# Patient Record
Sex: MALE | Race: WHITE | URBAN - METROPOLITAN AREA
[De-identification: names, ages, dates, MRNs, and addresses within clinical notes are randomized per-mention and may not be internally consistent; named-entity substitution may affect disease eponyms.]

---

## 2018-03-07 ENCOUNTER — TRANSFERRED RECORDS (OUTPATIENT)
Dept: HEALTH INFORMATION MANAGEMENT | Facility: CLINIC | Age: 38
End: 2018-03-07

## 2018-03-08 ENCOUNTER — HOSPITAL ENCOUNTER (INPATIENT)
Facility: HOSPITAL | Age: 38
LOS: 5 days | Discharge: HOME | End: 2018-03-13
Attending: PSYCHIATRY & NEUROLOGY | Admitting: PSYCHIATRY & NEUROLOGY
Payer: MEDICAID

## 2018-03-08 DIAGNOSIS — F31.9 BIPOLAR I DISORDER (H): ICD-10-CM

## 2018-03-08 DIAGNOSIS — F17.200 TOBACCO DEPENDENCE SYNDROME: ICD-10-CM

## 2018-03-08 DIAGNOSIS — F41.9 ANXIETY: Primary | ICD-10-CM

## 2018-03-08 PROBLEM — F32.A DEPRESSION WITH SUICIDAL IDEATION: Status: ACTIVE | Noted: 2018-03-08

## 2018-03-08 PROBLEM — R45.851 DEPRESSION WITH SUICIDAL IDEATION: Status: ACTIVE | Noted: 2018-03-08

## 2018-03-08 PROCEDURE — 25000132 ZZH RX MED GY IP 250 OP 250 PS 637: Performed by: NURSE PRACTITIONER

## 2018-03-08 PROCEDURE — 99223 1ST HOSP IP/OBS HIGH 75: CPT | Performed by: NURSE PRACTITIONER

## 2018-03-08 PROCEDURE — 12400000 ZZH R&B MH

## 2018-03-08 PROCEDURE — 12400011

## 2018-03-08 RX ORDER — HYDROXYZINE HYDROCHLORIDE 25 MG/1
25-50 TABLET, FILM COATED ORAL EVERY 4 HOURS PRN
Status: DISCONTINUED | OUTPATIENT
Start: 2018-03-08 | End: 2018-03-13 | Stop reason: HOSPADM

## 2018-03-08 RX ORDER — OLANZAPINE 10 MG/2ML
10 INJECTION, POWDER, FOR SOLUTION INTRAMUSCULAR 3 TIMES DAILY PRN
Status: DISCONTINUED | OUTPATIENT
Start: 2018-03-08 | End: 2018-03-13 | Stop reason: HOSPADM

## 2018-03-08 RX ORDER — DIVALPROEX SODIUM 500 MG/1
500 TABLET, DELAYED RELEASE ORAL DAILY
Status: ON HOLD | COMMUNITY
End: 2018-03-13

## 2018-03-08 RX ORDER — DIVALPROEX SODIUM 500 MG/1
500 TABLET, DELAYED RELEASE ORAL 2 TIMES DAILY
Status: DISCONTINUED | OUTPATIENT
Start: 2018-03-08 | End: 2018-03-10

## 2018-03-08 RX ORDER — BISACODYL 10 MG
10 SUPPOSITORY, RECTAL RECTAL DAILY PRN
Status: DISCONTINUED | OUTPATIENT
Start: 2018-03-08 | End: 2018-03-13 | Stop reason: HOSPADM

## 2018-03-08 RX ORDER — ALUMINA, MAGNESIA, AND SIMETHICONE 2400; 2400; 240 MG/30ML; MG/30ML; MG/30ML
30 SUSPENSION ORAL EVERY 4 HOURS PRN
Status: DISCONTINUED | OUTPATIENT
Start: 2018-03-08 | End: 2018-03-13 | Stop reason: HOSPADM

## 2018-03-08 RX ORDER — OLANZAPINE 10 MG/1
10 TABLET ORAL 3 TIMES DAILY PRN
Status: DISCONTINUED | OUTPATIENT
Start: 2018-03-08 | End: 2018-03-13 | Stop reason: HOSPADM

## 2018-03-08 RX ORDER — TRAZODONE HYDROCHLORIDE 50 MG/1
50 TABLET, FILM COATED ORAL
Status: DISCONTINUED | OUTPATIENT
Start: 2018-03-08 | End: 2018-03-13 | Stop reason: HOSPADM

## 2018-03-08 RX ORDER — TRAZODONE HYDROCHLORIDE 150 MG/1
150 TABLET ORAL AT BEDTIME
Status: DISCONTINUED | OUTPATIENT
Start: 2018-03-08 | End: 2018-03-13 | Stop reason: HOSPADM

## 2018-03-08 RX ORDER — DIVALPROEX SODIUM 500 MG/1
1000 TABLET, DELAYED RELEASE ORAL AT BEDTIME
Status: ON HOLD | COMMUNITY
End: 2018-03-13

## 2018-03-08 RX ORDER — ACETAMINOPHEN 325 MG/1
650 TABLET ORAL EVERY 4 HOURS PRN
Status: DISCONTINUED | OUTPATIENT
Start: 2018-03-08 | End: 2018-03-13 | Stop reason: HOSPADM

## 2018-03-08 RX ORDER — GABAPENTIN 300 MG/1
300 CAPSULE ORAL 3 TIMES DAILY
Status: DISCONTINUED | OUTPATIENT
Start: 2018-03-08 | End: 2018-03-09

## 2018-03-08 RX ADMIN — DIVALPROEX SODIUM 500 MG: 500 TABLET, DELAYED RELEASE ORAL at 20:16

## 2018-03-08 RX ADMIN — NICOTINE POLACRILEX 4 MG: 4 LOZENGE ORAL at 20:16

## 2018-03-08 RX ADMIN — TRAZODONE HYDROCHLORIDE 150 MG: 150 TABLET ORAL at 20:16

## 2018-03-08 RX ADMIN — NICOTINE POLACRILEX 4 MG: 4 LOZENGE ORAL at 23:12

## 2018-03-08 RX ADMIN — GABAPENTIN 300 MG: 300 CAPSULE ORAL at 14:35

## 2018-03-08 RX ADMIN — NICOTINE POLACRILEX 4 MG: 4 LOZENGE ORAL at 17:23

## 2018-03-08 RX ADMIN — GABAPENTIN 300 MG: 300 CAPSULE ORAL at 20:16

## 2018-03-08 RX ADMIN — NICOTINE POLACRILEX 4 MG: 4 LOZENGE ORAL at 18:35

## 2018-03-08 ASSESSMENT — ACTIVITIES OF DAILY LIVING (ADL)
GROOMING: INDEPENDENT
ORAL_HYGIENE: INDEPENDENT
LAUNDRY: UNABLE TO COMPLETE
DRESS: SCRUBS (BEHAVIORAL HEALTH);INDEPENDENT
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
## 2018-03-08 NOTE — IP AVS SNAPSHOT
` `           [facility]: [phone]                                              INTERAGENCY TRANSFER FORM - NURSING   3/8/2018                    Hospital Admission Date: 3/8/2018  TASHA MARKS   : 1980  Sex: Male        Attending Provider: Ruby Boyce NP     Allergies:  Latex    Infection:  None   Service:  Medicaid Com    Ht:  1.829 m (6')   Wt:  109.6 kg (241 lb 9.6 oz)   Admission Wt:  106.4 kg (234 lb 8 oz)    BMI:  32.77 kg/m 2   BSA:  2.36 m 2            Patient PCP Information     Provider PCP Type    Physician No Ref-Primary General      Current Code Status     Date Active Code Status Order ID Comments User Context       3/8/2018  9:27 AM Full Code [id number]  Ruby Boyce NP Inpatient       Code Status History     Date Active Date Inactive Code Status Order ID Comments User Context    This patient has a current code status but no historical code status.      Advance Directives        Scanned docmt in ACP Activity?           No scanned doc        Hospital Problems as of 3/13/2018              Priority Class Noted POA    Depression with suicidal ideation Medium  3/8/2018 Yes      Non-Hospital Problems as of 3/13/2018     None      Immunizations     None         END      ASSESSMENT     Discharge Profile Flowsheet     COMMUNICATION ASSESSMENT     Focused inspection of bony prominences  Nose 18 0854    Patient's communication style  spoken language (English or Bilingual) 18 1129   Procedural focused assessment (identify areas inspected)   Cheek, left;Cheek, right;Ear, left;Ear, right;Nose 18 0854    SKIN     Skin WDL  WDL 18 0854    Inspection of bony prominences  Procedural focused assessment (identify areas inspected) 18 0854                      Assessment WDL (Within Defined Limits) Definitions           Skin WDL     Effective: 09/28/15    Row Information: <b>WDL Definition:</b> Warm; dry; intact; elastic; without discoloration; pressure  "points without redness<br> <font color=\"gray\"><i>Item=AS skin wdl>>List=AS skin wdl>>Version=F14</i></font>      Vitals     Vital Signs Flowsheet     VITAL SIGNS     Patient Currently in Pain  denies 03/13/18 0855    Temp  96  F (35.6  C) 03/13/18 0619   HEIGHT AND WEIGHT      Temp src  Tympanic 03/13/18 0619   Height  1.829 m (6') 03/08/18 1051    Resp  16 03/13/18 0619   Height Method  Stated 03/08/18 1051    Pulse  82 03/13/18 0619   Weight  109.6 kg (241 lb 9.6 oz) 03/11/18 0844    Pulse/Heart Rate Source  Monitor 03/13/18 0619   Weight Method  Standing scale 03/08/18 1051    BP  110/83 03/13/18 0619   BSA (Calculated - sq m)  2.32 03/08/18 1051    OXYGEN THERAPY     BMI (Calculated)  31.87 03/08/18 1051    SpO2  98 % 03/13/18 0619   DAILY CARE      O2 Device  None (Room air) 03/13/18 0619   Activity Assistance Provided  independent 03/13/18 0854    PAIN/COMFORT                   Patient Lines/Drains/Airways Status    Active LINES/DRAINS/AIRWAYS     None            Patient Lines/Drains/Airways Status    Active PICC/CVC     None            Intake/Output Detail Report     None      Case Management/Discharge Planning     Case Management/Discharge Planning Flowsheet     LIVING ENVIRONMENT     ABUSE RISK SCREEN      Lives With  friend(s) (house) 03/08/18 1132   QUESTION TO PATIENT:  Has a member of your family or a partner(now or in the past) intimidated, hurt, manipulated, or controlled you in any way?  no 03/08/18 1414    COPING/STRESS     QUESTION TO PATIENT: Do you feel safe going back to the place where you are living?  yes 03/08/18 1414    Major Change/Loss/Stressor  job change/loss;family problems 03/08/18 1136   OBSERVATION: Is there reason to believe there has been maltreatment of a vulnerable adult (ie. Physical/Sexual/Emotional abuse, self neglect, lack of adequate food, shelter, medical care, or financial exploitation)?  no 03/08/18 1414            "

## 2018-03-08 NOTE — IP AVS SNAPSHOT
` `     [facility]: [phone]            Medication Administration Report for Chandan Ma as of 03/13/18 1133   Legend:    Given Hold Not Given Due Canceled Entry Other Actions    Time Time (Time) Time  Time-Action       Inactive    Active    Linked        Medications 03/07/18 03/08/18 03/09/18 03/10/18 03/11/18 03/12/18 03/13/18    acetaminophen (TYLENOL) tablet 650 mg  Dose: 650 mg  Freq: EVERY 4 HOURS PRN Route: PO  PRN Reason: mild pain  Start: 03/08/18 0927   Admin Instructions: Do not use if the patient has significant liver disease. MAX acetaminophen = 4000 mg/24 hrs.  MAX acetaminophen <3000 mg/24 hrs for patients > or = 65 years old.  Maximum acetaminophen dose from all sources = 75 mg/kg/day not to exceed 4 grams/day.               alum & mag hydroxide-simethicone (MYLANTA ES/MAALOX  ES) suspension 30 mL  Dose: 30 mL  Freq: EVERY 4 HOURS PRN Route: PO  PRN Reason: indigestion  Start: 03/08/18 0927   Admin Instructions: Shake well.               bisacodyl (DULCOLAX) Suppository 10 mg  Dose: 10 mg  Freq: DAILY PRN Route: RE  PRN Reason: constipation  Start: 03/08/18 0927              divalproex sodium delayed-release (DEPAKOTE SPRINKLE) DR capsule 1,000 mg  Dose: 1,000 mg  Freq: AT BEDTIME Route: PO  Start: 03/10/18 2100       2012 (1,000 mg)-Given        2014 (1,000 mg)-Given        2000 (1,000 mg)-Given        [ ] 2100           divalproex sodium delayed-release (DEPAKOTE) DR tablet 500 mg  Dose: 500 mg  Freq: DAILY Route: PO  Start: 03/11/18 0900   Admin Instructions: DO NOT CRUSH.         0844 (500 mg)-Given        0812 (500 mg)-Given        0827 (500 mg)-Given           gabapentin (NEURONTIN) capsule 900 mg  Dose: 900 mg  Freq: 3 TIMES DAILY Route: PO  Start: 03/10/18 2100       2012 (900 mg)-Given        0844 (900 mg)-Given       1333 (900 mg)-Given       2014 (900 mg)-Given        0812 (900 mg)-Given       1317 (900 mg)-Given       2002 (900 mg)-Given        0827 (900 mg)-Given        [ ] 1400       [ ] 2100           hydrOXYzine (ATARAX) tablet 25-50 mg  Dose: 25-50 mg  Freq: EVERY 4 HOURS PRN Route: PO  PRN Reason: anxiety  Start: 03/08/18 0927      1716 (25 mg)-Given       2232 (50 mg)-Given        1144 (50 mg)-Given       1553 (50 mg)-Given       2016 (50 mg)-Given        1218 (50 mg)-Given       1736 (50 mg)-Given       2214 (50 mg)-Given        1620 (50 mg)-Given       2222 (50 mg)-Given        1043 (50 mg)-Given           magnesium hydroxide (MILK OF MAGNESIA) suspension 30 mL  Dose: 30 mL  Freq: AT BEDTIME PRN Route: PO  PRN Reason: constipation  Start: 03/08/18 0927   Admin Instructions: Shake well.               nicotine polacrilex lozenge 4-8 mg  Dose: 4-8 mg  Freq: EVERY 1 HOUR PRN Route: BU  PRN Reason: smoking cessation  Start: 03/08/18 1407   Admin Instructions: Allow lozenge to dissolve completely.  Do Not Bite, Chew, or Swallow.      1723 (4 mg)-Given       1835 (4 mg)-Given       2016 (4 mg)-Given       2312 (4 mg)-Given        1501 (4 mg)-Given       1611 (4 mg)-Given       1716 (4 mg)-Given       1854 (4 mg)-Given       2003 (4 mg)-Given       2137 (4 mg)-Given       2232 (4 mg)-Given        0019 (4 mg)-Given       0615 (4 mg)-Given [C]       0835 (4 mg)-Given       1018 (4 mg)-Given       1221 (4 mg)-Given       1510 (4 mg)-Given       1719 (4 mg)-Given       1828 (4 mg)-Given       2014 (4 mg)-Given       2146 (4 mg)-Given        0030 (4 mg)-Given [C]       0844 (4 mg)-Given       1218 (4 mg)-Given       1545 (4 mg)-Given       1724 (4 mg)-Given       1916 (4 mg)-Given       2102 (4 mg)-Given       2337 (4 mg)-Given        0056 (4 mg)-Given       0421 (4 mg)-Given       0607 (4 mg)-Given       0704 (4 mg)-Given       0830 (4 mg)-Given       1235 (4 mg)-Given       1620 (4 mg)-Given       1827 (8 mg)-Given       2221 (8 mg)-Given       2358 (4 mg)-Given        0602 (4 mg)-Given       0858 (4 mg)-Given           OLANZapine (zyPREXA) tablet 10 mg  Dose: 10 mg  Freq: 3 TIMES  DAILY PRN Route: PO  PRN Reason: agitation  PRN Comment: associated with psychosis or jacob  Start: 03/08/18 0927   Admin Instructions: Consider lower dose if sedation or hypotension.  Combined IM and PO doses may significantly increase the risk of orthostatic hypotension at 30 mg per day or higher.              Or  OLANZapine (zyPREXA) injection 10 mg  Dose: 10 mg  Freq: 3 TIMES DAILY PRN Route: IM  PRN Reason: agitation  PRN Comment: associated with psychosis or jacob  Start: 03/08/18 0927   Admin Instructions: Not to exceed 30 mg in 24 hours.  Consider lower dose if sedation or hypotension.  Dissolve the contents of the 10 mg vial using 2.1 mL of Sterile Water for Injection to provide a solution containing 5 mg/mL of olanzapine. Withdraw the ordered dose from vial. Use immediately (within 1 hour) after reconstitution. Discard any unused portion.               traZODone (DESYREL) tablet 150 mg  Dose: 150 mg  Freq: AT BEDTIME Route: PO  Start: 03/08/18 2100 2016 (150 mg)-Given        2003 (150 mg)-Given        2012 (150 mg)-Given        2015 (150 mg)-Given        2003 (150 mg)-Given        [ ] 2100           traZODone (DESYREL) tablet 50 mg  Dose: 50 mg  Freq: AT BEDTIME PRN Route: PO  PRN Reason: sleep  Start: 03/08/18 0927   Admin Instructions: May repeat x 1        0031 (50 mg)-Given [C]        2330 (50 mg)-Given        2358 (50 mg)-Given           Future Medications  Medications 03/07/18 03/08/18 03/09/18 03/10/18 03/11/18 03/12/18 03/13/18       lurasidone (LATUDA) tablet 120 mg  Dose: 120 mg  Freq: DAILY WITH BREAKFAST Route: PO  Start: 03/14/18 0800             Completed Medications  Medications 03/07/18 03/08/18 03/09/18 03/10/18 03/11/18 03/12/18 03/13/18         Dose: 100 mg  Freq: ONCE Route: PO  Start: 03/13/18 0800   End: 03/13/18 0828          0828 (100 mg)-Given          Discontinued Medications  Medications 03/07/18 03/08/18 03/09/18 03/10/18 03/11/18 03/12/18 03/13/18         Dose: 500  mg  Freq: 2 TIMES DAILY Route: PO  Start: 03/08/18 2100   End: 03/10/18 1420   Admin Instructions: DO NOT CRUSH.      2016 (500 mg)-Given        0833 (500 mg)-Given       2003 (500 mg)-Given        0832 (500 mg)-Given       1420-Med Discontinued            Dose: 600 mg  Freq: 3 TIMES DAILY Route: PO  Start: 03/09/18 2100   End: 03/10/18 1420      2003 (600 mg)-Given        0832 (600 mg)-Given       1326 (600 mg)-Given       1420-Med Discontinued            Dose: 60 mg  Freq: DAILY WITH BREAKFAST Route: PO  Start: 03/09/18 0800   End: 03/10/18 1420      0833 (60 mg)-Given        0832 (60 mg)-Given       1420-Med Discontinued            Dose: 80 mg  Freq: DAILY WITH BREAKFAST Route: PO  Start: 03/11/18 0800   End: 03/12/18 1323        0844 (80 mg)-Given        0812 (80 mg)-Given       1323-Med Discontinued

## 2018-03-08 NOTE — IP AVS SNAPSHOT
[facility]: [phone]                                              INTERAGENCY TRANSFER FORM - LAB / IMAGING / EKG / EMG RESULTS   3/8/2018                    Hospital Admission Date: 3/8/2018  TASHA MARKS   : 1980  Sex: Male        Attending Provider: Ruby Boyce NP     Allergies:  Latex    Infection:  None   Service:  Medicaid Com    Ht:  1.829 m (6')   Wt:  109.6 kg (241 lb 9.6 oz)   Admission Wt:  106.4 kg (234 lb 8 oz)    BMI:  32.77 kg/m 2   BSA:  2.36 m 2            Patient PCP Information     Provider PCP Type    Physician No Ref-Primary General         Lab Results - 3 Days      Valproic acid [[id number]]  Resulted: 18 0630, Result status: Final result    Ordering provider: Bridger Cade NP  18 0001 Resulting lab: [facility]    Specimen Information    Type Source Collected On   Blood  18 0551          Components       Value Reference Range Flag Lab   Valproic Acid Level 92 50 - 100 mg/L  [facility]            Testing Performed By     Lab - Abbreviation Name Director Address Valid Date Range    210 - HI [facility] Unknown [address] [address] 05/08/15 1057 - Present            Unresulted Labs     None      Encounter-Level Documents:     There are no encounter-level documents.      Order-Level Documents:     There are no order-level documents.

## 2018-03-08 NOTE — IP AVS SNAPSHOT
[facility]: [phone]                                              INTERAGENCY TRANSFER FORM - PHYSICIAN ORDERS   3/8/2018                    Hospital Admission Date: 3/8/2018  TASHA MARKS   : 1980  Sex: Male        Attending Provider: Ruby Boyce NP     Allergies:  Latex    Infection:  None   Service:  Medicaid Com    Ht:  1.829 m (6')   Wt:  109.6 kg (241 lb 9.6 oz)   Admission Wt:  106.4 kg (234 lb 8 oz)    BMI:  32.77 kg/m 2   BSA:  2.36 m 2            Patient PCP Information     Provider PCP Type    Physician No Ref-Primary General      ED Clinical Impression     Diagnosis Description Comment Added By Time Added    Anxiety [F41.9] Anxiety [F41.9]  Bridger Cade NP 3/13/2018 10:32 AM    Bipolar I disorder (H) [F31.9] Bipolar I disorder (H) [F31.9]  Bridger Cade NP 3/13/2018 10:35 AM    Tobacco dependence syndrome [F17.200] Tobacco dependence syndrome [F17.200]  Bridger Cade NP 3/13/2018 10:37 AM      Hospital Problems as of 3/13/2018              Priority Class Noted POA    Depression with suicidal ideation Medium  3/8/2018 Yes      Non-Hospital Problems as of 3/13/2018     None      Code Status History     Date Active Date Inactive Code Status Order ID Comments User Context    This patient has a current code status but no historical code status.         Medication Review      START taking        Dose / Directions Comments    hydrOXYzine 25 MG tablet   Commonly known as:  ATARAX   Used for:  Anxiety        Dose:  25-50 mg   Take 1-2 tablets (25-50 mg) by mouth every 4 hours as needed for anxiety   Quantity:  60 tablet   Refills:  0          CONTINUE these medications which may have CHANGED, or have new prescriptions. If we are uncertain of the size of tablets/capsules you have at home, strength may be listed as something that might have changed.        Dose / Directions Comments    gabapentin 300 MG capsule   Commonly known as:  NEURONTIN   This may have changed:     - medication strength  - how much to take   Used for:  Anxiety, Bipolar I disorder (H)        Dose:  900 mg   Take 3 capsules (900 mg) by mouth 3 times daily   Quantity:  270 capsule   Refills:  0        lurasidone 120 MG Tabs tablet   Commonly known as:  LATUDA   This may have changed:    - medication strength  - when to take this  - additional instructions   Used for:  Bipolar I disorder (H)        Dose:  120 mg   Start taking on:  3/14/2018   Take 1 tablet (120 mg) by mouth daily (with breakfast)   Quantity:  30 tablet   Refills:  0        nicotine polacrilex 4 MG lozenge   Commonly known as:  NICORETTE   This may have changed:  how much to take   Used for:  Tobacco dependence syndrome        Dose:  4-8 mg   Place 1-2 lozenges (4-8 mg) inside cheek every hour as needed for smoking cessation   Quantity:  168 tablet   Refills:  0        traZODone 150 MG tablet   Commonly known as:  DESYREL   This may have changed:  medication strength   Used for:  Anxiety        Dose:  150 mg   Take 1 tablet (150 mg) by mouth At Bedtime   Quantity:  30 tablet   Refills:  0          CONTINUE these medications which have NOT CHANGED        Dose / Directions Comments    * divalproex sodium delayed-release 500 MG DR tablet   Commonly known as:  DEPAKOTE   Used for:  Bipolar I disorder (H)        Dose:  500 mg   Take 1 tablet (500 mg) by mouth daily Take daily in the morning   Quantity:  30 tablet   Refills:  0        * divalproex sodium delayed-release 500 MG DR tablet   Commonly known as:  DEPAKOTE   Used for:  Bipolar I disorder (H)        Dose:  1000 mg   Take 2 tablets (1,000 mg) by mouth At Bedtime   Quantity:  60 tablet   Refills:  0        * Notice:  This list has 2 medication(s) that are the same as other medications prescribed for you. Read the directions carefully, and ask your doctor or other care provider to review them with you.              Further instructions from your care team       Behavioral Discharge Planning and  Instructions    Summary: Chandan is a 38 year-old male who was admitted to [address] [place] [address] with suicidal ideation.    Main Diagnosis: Bipolar I Disorder, per history, currently depressed, PTSD, Chronic, Generalized Anxiety Disorder, Antisocial Personality Disorder    Major Treatments, Procedures and Findings: Stabilize with medications, connect with community programs.    Symptoms to Report: feeling more aggressive, increased confusion, losing more sleep, mood getting worse or thoughts of suicide    Lifestyle Adjustment: Take all medications as prescribed, meet with doctor/ medication provider, out patient therapist, , and [facility] worker as scheduled. Abstain from alcohol or any unprescribed drugs.    Psychiatry Follow-up:     [facility]- Medication Management: Mi Sandoval Thursday, May 24th at 12:00 and June 21st @ 2:00 pm  Call 24 hours in advance if canceling.   [address] [place] [address] [address]  [place], [place]  Phone: [phone]  Fax: [phone]    [facility] Human Services:  [address] [place] [address] [address]  [place] [address]  [phone]  Toll Free: [phone]  Fax: [phone]    [facility] Human Services: : Johanna Weir [phone]  [address] [place] [address]  Phone: [phone]    [facility]  PCP-  Alvarado 3/23 @1:10   [address] [place] [address]  Phone: [phone]   Fax: [phone]     Resources:   Crisis Intervention: 305.342.5441 or 253-099-1773 (TTY: 234.657.8878).  Call anytime for help.  National Cooks on Mental Illness (www.mn.liliam.org): 281.646.2345 or 455-778-5062.  Alcoholics Anonymous (www.alcoholics-anonymous.org): Check your phone book for your local chapter.  Suicide Awareness Voices of Education (SAVE) (www.save.org): 185-295-OEND (9935)  National Suicide Prevention Line (www.mentalhealthmn.org): 256-259-RWWM (6785)  Mental Health Consumer/Survivor Network of [place]  (www.[facility].net): 620-283-2560 or 450-863-7345  Mental Health Association of [place] (www.mentalhealth.org): 764.251.2854 or 751-537-9350    General Medication Instructions:   See your medication sheet(s) for instructions.   Take all medicines as directed.  Make no changes unless your doctor suggests them.   Go to all your doctor visits.  Be sure to have all your required lab tests. This way, your medicines can be refilled on time.  Do not use any drugs not prescribed by your doctor.  Avoid alcohol.    [place] Area:  [facility], Crisis stabilization [facility]- [phone]  [facility] Crisis Line: 1-032-468-7542  [facility]ates For Family Peace: 877-1031  Sexual Assault Program [facility]: [phone] or 1-389.189.7030  [facility] Battered Women's Program: 6-705-005-2721 Ext: 279       Calls answered Mon-Fri-8:00 am--4:30 pm    [place]:  [facility] Family Peace: 6-909-684-6942  [facility] first call for help: [phone]  [facility]:  ([phone]      [place] Area:  Warm Line: 1-172.616.7230       Calls answered Tuesday--Saturday 4:00 pm--10:00 pm  [facility] Crisis Line - [phone]  [facility] 845-635-1981    [place] Statewide:  [place] Crisis and Referral Services: 0-312-037-7956  National Suicide Prevention Lifeline: 2-362-070-TALK (7502)   - alm4fqtf- Text  Life  to [address]  First Call for Help: 2-1-1  [facility] Helpline- 8-973-DBZN-HELP        Statement of Approval     Ordered          03/13/18 1039  I have reviewed and agree with all the recommendations and orders detailed in this document.  EFFECTIVE NOW     Approved and electronically signed by:  Bridger Cade NP

## 2018-03-08 NOTE — PROGRESS NOTES
03/08/18 0952   Patient Belongings   Did you bring any home meds/supplements to the hospital?  No   Patient Belongings cell phone/electronics;clothing;keys;wallet   Disposition of Belongings Other (see comment)   Belongings Search Yes   Clothing Search Yes   Second Staff JACQUES   General Info Comment WITH PATIENT: nothing    IN SAFE: 1 gold key, pink ear buds, black cell phone with cracks, blue and grey cellphone case, [place] card, [place] identification card, social security card, [employer] debit card, [place] debit card    IN BELONGINGS ROOM: msc cards, camo wallet, black jacket, black and white teners, 2 grey socks, yellow shirt, blue jeans dark and light pair, grey undies, 6 white socks, white blanket, blue and white belt, grey pants, 1 quarter, 4 pennys, grey undies, orange undies, red jersey, red backpack,  grey shirt, plad shorts, 2 blue longsleeve shirt, 21 nicotine losenges,  redness relief,     List items sent to safe: 1 gold key, pink earbuds, black cellphone with cracks, blue grey cellphone case, [place] card, [place] identification card, social security card, [employer] debit card, [place] debit card  All other belongings put in assigned cubby in belongings room.     I have reviewed my belongings list on admission and verify that it is correct.     Patient signature_______________________________    Second staff witness (if patient unable to sign) ______________________________       I have received all my belongings at discharge.    Patient signature________________________________    [place]   3/8/2018  9:58 AM

## 2018-03-08 NOTE — PLAN OF CARE
Face to face end of shift report received from SEBASTIAN Farris. Rounding completed. Patient observed in bed.     Kriss Ferreira  3/8/2018  3:33 PM

## 2018-03-08 NOTE — IP AVS SNAPSHOT
[facility]    [address] [place] [id number]    Phone:  [phone]    Fax:  [phone]                                       After Visit Summary   3/8/2018    Chandan Ma    MRN: [id number]           After Visit Summary Signature Page     I have received my discharge instructions, and my questions have been answered. I have discussed any challenges I see with this plan with the nurse or doctor.    ..........................................................................................................................................  Patient/Patient Representative Signature      ..........................................................................................................................................  Patient Representative Print Name and Relationship to Patient    ..................................................               ................................................  Date                                            Time    ..........................................................................................................................................  Reviewed by Signature/Title    ...................................................              ..............................................  Date                                                            Time

## 2018-03-08 NOTE — IP AVS SNAPSHOT
Chandan Ma #[id number] (CSN: [id number])  (38 year old M)  (Adm: 18)     [id number]               [facility]: [phone]            Patient Demographics     Patient Name Sex          Age SSN Address Phone    Chandan Ma Male 1980 (38 year old)  [address]  [facility] [id number] [phone] (Home)      Emergency Contact(s)     Name Relation Home Work Mobile    NO PRIMARY CONTACT  [phone]        Admission Information     Attending Provider Admitting Provider Admission Type Admission Date/Time    Ruby Boyce NP Simon, John E, MD Emergency 18  0906    Discharge Date Hospital Service Auth/Cert Status Service Area     LT Medicaid Commitment Incomplete [facility] SERVICES    Unit Room/Bed Admission Status       [facility] [id number] Admission (Confirmed)       Admission     Complaint    mental health, Depression with suicidal ideation      Hospital Account     Name Acct ID Class Status Primary Coverage    Chandan Ma [id number] C Psychiatric Open None            Guarantor Account (for Hospital Account #[id number])     Name Relation to Pt Service Area Active? Acct Type    Chandan Ma Self [facility] Yes Behavioral    Address Phone          [address] [place], [place] [id number] [phone](H)              Coverage Information (for Hospital Account #[id number])     Not on file                                                INTERAGENCY TRANSFER FORM - PHYSICIAN ORDERS   3/8/2018                       [facility]: [phone]            Attending Provider: Ruby Boyce NP     Allergies:  Latex    Infection:  None   Service:  Medicaid Com    Ht:  1.829 m (6')   Wt:  109.6 kg (241 lb 9.6 oz)   Admission Wt:  106.4 kg (234 lb 8 oz)    BMI:  32.77 kg/m 2   BSA:  2.36 m 2            ED Clinical Impression     Diagnosis Description Comment Added By Time Added    Anxiety [F41.9] Anxiety [F41.9]  Bridger Cade,  NP 3/13/2018 10:32 AM    Bipolar I disorder (H) [F31.9] Bipolar I disorder (H) [F31.9]  Bridger Cade NP 3/13/2018 10:35 AM    Tobacco dependence syndrome [F17.200] Tobacco dependence syndrome [F17.200]  Bridger Cade NP 3/13/2018 10:37 AM      Hospital Problems as of 3/13/2018              Priority Class Noted POA    Depression with suicidal ideation Medium  3/8/2018 Yes      Non-Hospital Problems as of 3/13/2018     None      Code Status History     Date Active Date Inactive Code Status Order ID Comments User Context    This patient has a current code status but no historical code status.      Current Code Status     Date Active Code Status Order ID Comments User Context       3/8/2018  9:27 AM Full Code [id number]  Ruby Boyce NP Inpatient          Medication Review      START taking        Dose / Directions Comments    hydrOXYzine 25 MG tablet   Commonly known as:  ATARAX   Used for:  Anxiety        Dose:  25-50 mg   Take 1-2 tablets (25-50 mg) by mouth every 4 hours as needed for anxiety   Quantity:  60 tablet   Refills:  0          CONTINUE these medications which may have CHANGED, or have new prescriptions. If we are uncertain of the size of tablets/capsules you have at home, strength may be listed as something that might have changed.        Dose / Directions Comments    gabapentin 300 MG capsule   Commonly known as:  NEURONTIN   This may have changed:    - medication strength  - how much to take   Used for:  Anxiety, Bipolar I disorder (H)        Dose:  900 mg   Take 3 capsules (900 mg) by mouth 3 times daily   Quantity:  270 capsule   Refills:  0        lurasidone 120 MG Tabs tablet   Commonly known as:  LATUDA   This may have changed:    - medication strength  - when to take this  - additional instructions   Used for:  Bipolar I disorder (H)        Dose:  120 mg   Start taking on:  3/14/2018   Take 1 tablet (120 mg) by mouth daily (with breakfast)   Quantity:  30 tablet   Refills:  0         nicotine polacrilex 4 MG lozenge   Commonly known as:  NICORETTE   This may have changed:  how much to take   Used for:  Tobacco dependence syndrome        Dose:  4-8 mg   Place 1-2 lozenges (4-8 mg) inside cheek every hour as needed for smoking cessation   Quantity:  168 tablet   Refills:  0        traZODone 150 MG tablet   Commonly known as:  DESYREL   This may have changed:  medication strength   Used for:  Anxiety        Dose:  150 mg   Take 1 tablet (150 mg) by mouth At Bedtime   Quantity:  30 tablet   Refills:  0          CONTINUE these medications which have NOT CHANGED        Dose / Directions Comments    * divalproex sodium delayed-release 500 MG DR tablet   Commonly known as:  DEPAKOTE   Used for:  Bipolar I disorder (H)        Dose:  500 mg   Take 1 tablet (500 mg) by mouth daily Take daily in the morning   Quantity:  30 tablet   Refills:  0        * divalproex sodium delayed-release 500 MG DR tablet   Commonly known as:  DEPAKOTE   Used for:  Bipolar I disorder (H)        Dose:  1000 mg   Take 2 tablets (1,000 mg) by mouth At Bedtime   Quantity:  60 tablet   Refills:  0        * Notice:  This list has 2 medication(s) that are the same as other medications prescribed for you. Read the directions carefully, and ask your doctor or other care provider to review them with you.              Further instructions from your care team       Behavioral Discharge Planning and Instructions    Summary: Chandan is a 38 year-old male who was admitted to [address] [place] [address] with suicidal ideation.    Main Diagnosis: Bipolar I Disorder, per history, currently depressed, PTSD, Chronic, Generalized Anxiety Disorder, Antisocial Personality Disorder    Major Treatments, Procedures and Findings: Stabilize with medications, connect with community programs.    Symptoms to Report: feeling more aggressive, increased confusion, losing more sleep, mood getting worse or thoughts of suicide    Lifestyle Adjustment: Take all medications as  prescribed, meet with doctor/ medication provider, out patient therapist, , and [facility] worker as scheduled. Abstain from alcohol or any unprescribed drugs.    Psychiatry Follow-up:     [facility]: Mi Sandoval Thursday, May 24th at 12:00 and June 21st @ 2:00 pm  Call 24 hours in advance if canceling.   [address]  [place], [place]  Phone: [phone]  Fax: [phone]    [facility] Human Services:  [address]  [place] [address]  [phone]  Toll Free: [phone]  Fax: [phone]    [facility] Services: : Johanna Weir [phone]  [address] [place] [address]  Phone: [phone]    [facility]  PCP-  Alvarado 3/23 @1:10   [address] [place] [address]  Phone: [phone]   Fax: [phone]     Resources:   Crisis Intervention: 614.328.9139 or 129-432-2969 (TTY: 865.312.7778).  Call anytime for help.  [place] on Mental Illness (www.mn.liliam.org): 975.526.7225 or 461-860-2763.  Alcoholics Anonymous (www.alcoholics-anonymous.org): Check your phone book for your local chapter.  Suicide Awareness Voices of Education (SAVE) (www.save.org): 808-949-ATNQ (9338)  National Suicide Prevention Line (www.mentalhealthmn.org): 508-815-NKED (1436)  Mental Health Consumer/Survivor Network of [place] (www.mhcsn.net): 303.497.5581 or 661-958-9572  Mental Health Association of [place] (www.mentalhealth.org): 636.966.1650 or 244-191-1277    General Medication Instructions:   See your medication sheet(s) for instructions.   Take all medicines as directed.  Make no changes unless your doctor suggests them.   Go to all your doctor visits.  Be sure to have all your required lab tests. This way, your medicines can be refilled on time.  Do not use any drugs not prescribed by your doctor.  Avoid alcohol.    [place] Area:  [facility], [facility]  stabilization housing- [phone]  [facility] Crisis Line: 9-399-653-3945  Adv[facility]ates For Family Peace: 923-8531  [facility] [place]: 954.291.1485 or 1-496.188.2295  [place] [facility]: 6-791-696-4228 Ext: 279       Calls answered Mon-Fri-8:00 am--4:30 pm    [place]:  [facility] Family Peace: 7-693-100-4481  [facility] first call for help: 4-719-405-1573  [facility]:  (533) 502-4655      [place] Area:  Warm Line: 1-500.552.4776       Calls answered Tuesday--Saturday 4:00 pm--10:00 pm  [facility] Crisis Line - [phone]  [facility] 741-843-4776    [place] Statewide:  [place] Crisis and Referral Services: 0-807-655-2287  National Suicide Prevention Lifeline: 5-388-289-TALK (5060)   - lnj0lcoh- Text  Life  to [address]  First Call for Help: 2-1-1  [facility] Helpline- 5-161-SNHL-HELP        Statement of Approval     Ordered          03/13/18 1039  I have reviewed and agree with all the recommendations and orders detailed in this document.  EFFECTIVE NOW     Approved and electronically signed by:  Bridger Cade NP                                                 INTERAGENCY TRANSFER FORM - NURSING   3/8/2018                       [facility]: [phone]            Attending Provider: Ruby Boyce NP     Allergies:  Latex    Infection:  None   Service:  Medicaid Com    Ht:  1.829 m (6')   Wt:  109.6 kg (241 lb 9.6 oz)   Admission Wt:  106.4 kg (234 lb 8 oz)    BMI:  32.77 kg/m 2   BSA:  2.36 m 2            Advance Directives        Scanned docmt in ACP Activity?           No scanned doc        Immunizations     None      ASSESSMENT     Discharge Profile Flowsheet     COMMUNICATION ASSESSMENT     Focused inspection of bony prominences  Nose 03/13/18 0854    Patient's communication style  spoken language (English or Bilingual) 03/08/18 1129   Procedural focused assessment (identify areas inspected)   Cheek, left;Cheek, right;Ear,  "left;Ear, right;Nose 03/13/18 0854    SKIN     Skin WDL  WDL 03/13/18 0854    Inspection of bony prominences  Procedural focused assessment (identify areas inspected) 03/13/18 0854                      Assessment WDL (Within Defined Limits) Definitions           Skin WDL     Effective: 09/28/15    Row Information: <b>WDL Definition:</b> Warm; dry; intact; elastic; without discoloration; pressure points without redness<br> <font color=\"gray\"><i>Item=AS skin wdl>>List=AS skin wdl>>Version=F14</i></font>      Vitals     Vital Signs Flowsheet     VITAL SIGNS     Patient Currently in Pain  denies 03/13/18 0855    Temp  96  F (35.6  C) 03/13/18 0619   HEIGHT AND WEIGHT      Temp src  Tympanic 03/13/18 0619   Height  1.829 m (6') 03/08/18 1051    Resp  16 03/13/18 0619   Height Method  Stated 03/08/18 1051    Pulse  82 03/13/18 0619   Weight  109.6 kg (241 lb 9.6 oz) 03/11/18 0844    Pulse/Heart Rate Source  Monitor 03/13/18 0619   Weight Method  Standing scale 03/08/18 1051    BP  110/83 03/13/18 0619   BSA (Calculated - sq m)  2.32 03/08/18 1051    OXYGEN THERAPY     BMI (Calculated)  31.87 03/08/18 1051    SpO2  98 % 03/13/18 0619   DAILY CARE      O2 Device  None (Room air) 03/13/18 0619   Activity Assistance Provided  independent 03/13/18 0854    PAIN/COMFORT                   Patient Lines/Drains/Airways Status    Active LINES/DRAINS/AIRWAYS     None            Patient Lines/Drains/Airways Status    Active PICC/CVC     None            Intake/Output Detail Report     None      Case Management/Discharge Planning     Case Management/Discharge Planning Flowsheet     LIVING ENVIRONMENT     ABUSE RISK SCREEN      Lives With  friend(s) (house) 03/08/18 1132   QUESTION TO PATIENT:  Has a member of your family or a partner(now or in the past) intimidated, hurt, manipulated, or controlled you in any way?  no 03/08/18 1414    COPING/STRESS     QUESTION TO PATIENT: Do you feel safe going back to the place where you are living?  " yes 03/08/18 1414    Major Change/Loss/Stressor  job change/loss;family problems 03/08/18 1136   OBSERVATION: Is there reason to believe there has been maltreatment of a vulnerable adult (ie. Physical/Sexual/Emotional abuse, self neglect, lack of adequate food, shelter, medical care, or financial exploitation)?  no 03/08/18 1414                  [facility]: [phone]            Medication Administration Report for Chandan Ma as of 03/13/18 1219   Legend:    Given Hold Not Given Due Canceled Entry Other Actions    Time Time (Time) Time  Time-Action       Inactive    Active    Linked        Medications 03/07/18 03/08/18 03/09/18 03/10/18 03/11/18 03/12/18 03/13/18    acetaminophen (TYLENOL) tablet 650 mg  Dose: 650 mg  Freq: EVERY 4 HOURS PRN Route: PO  PRN Reason: mild pain  Start: 03/08/18 0927   Admin Instructions: Do not use if the patient has significant liver disease. MAX acetaminophen = 4000 mg/24 hrs.  MAX acetaminophen <3000 mg/24 hrs for patients > or = 65 years old.  Maximum acetaminophen dose from all sources = 75 mg/kg/day not to exceed 4 grams/day.               alum & mag hydroxide-simethicone (MYLANTA ES/MAALOX  ES) suspension 30 mL  Dose: 30 mL  Freq: EVERY 4 HOURS PRN Route: PO  PRN Reason: indigestion  Start: 03/08/18 0927   Admin Instructions: Shake well.               bisacodyl (DULCOLAX) Suppository 10 mg  Dose: 10 mg  Freq: DAILY PRN Route: RE  PRN Reason: constipation  Start: 03/08/18 0927              divalproex sodium delayed-release (DEPAKOTE SPRINKLE) DR capsule 1,000 mg  Dose: 1,000 mg  Freq: AT BEDTIME Route: PO  Start: 03/10/18 2100       2012 (1,000 mg)-Given        2014 (1,000 mg)-Given        2000 (1,000 mg)-Given        [ ] 2100           divalproex sodium delayed-release (DEPAKOTE) DR tablet 500 mg  Dose: 500 mg  Freq: DAILY Route: PO  Start: 03/11/18 0900   Admin Instructions: DO NOT CRUSH.         0844 (500 mg)-Given        0812 (500 mg)-Given        0827 (500  mg)-Given           gabapentin (NEURONTIN) capsule 900 mg  Dose: 900 mg  Freq: 3 TIMES DAILY Route: PO  Start: 03/10/18 2100       2012 (900 mg)-Given        0844 (900 mg)-Given       1333 (900 mg)-Given       2014 (900 mg)-Given        0812 (900 mg)-Given       1317 (900 mg)-Given       2002 (900 mg)-Given        0827 (900 mg)-Given       [ ] 1400       [ ] 2100           hydrOXYzine (ATARAX) tablet 25-50 mg  Dose: 25-50 mg  Freq: EVERY 4 HOURS PRN Route: PO  PRN Reason: anxiety  Start: 03/08/18 0927      1716 (25 mg)-Given       2232 (50 mg)-Given        1144 (50 mg)-Given       1553 (50 mg)-Given       2016 (50 mg)-Given        1218 (50 mg)-Given       1736 (50 mg)-Given       2214 (50 mg)-Given        1620 (50 mg)-Given       2222 (50 mg)-Given        1043 (50 mg)-Given           magnesium hydroxide (MILK OF MAGNESIA) suspension 30 mL  Dose: 30 mL  Freq: AT BEDTIME PRN Route: PO  PRN Reason: constipation  Start: 03/08/18 0927   Admin Instructions: Shake well.               nicotine polacrilex lozenge 4-8 mg  Dose: 4-8 mg  Freq: EVERY 1 HOUR PRN Route: BU  PRN Reason: smoking cessation  Start: 03/08/18 1407   Admin Instructions: Allow lozenge to dissolve completely.  Do Not Bite, Chew, or Swallow.      1723 (4 mg)-Given       1835 (4 mg)-Given       2016 (4 mg)-Given       2312 (4 mg)-Given        1501 (4 mg)-Given       1611 (4 mg)-Given       1716 (4 mg)-Given       1854 (4 mg)-Given       2003 (4 mg)-Given       2137 (4 mg)-Given       2232 (4 mg)-Given        0019 (4 mg)-Given       0615 (4 mg)-Given [C]       0835 (4 mg)-Given       1018 (4 mg)-Given       1221 (4 mg)-Given       1510 (4 mg)-Given       1719 (4 mg)-Given       1828 (4 mg)-Given       2014 (4 mg)-Given       2146 (4 mg)-Given        0030 (4 mg)-Given [C]       0844 (4 mg)-Given       1218 (4 mg)-Given       1545 (4 mg)-Given       1724 (4 mg)-Given       1916 (4 mg)-Given       2102 (4 mg)-Given       2337 (4 mg)-Given        0056 (4  mg)-Given       0421 (4 mg)-Given       0607 (4 mg)-Given       0704 (4 mg)-Given       0830 (4 mg)-Given       1235 (4 mg)-Given       1620 (4 mg)-Given       1827 (8 mg)-Given       2221 (8 mg)-Given       2358 (4 mg)-Given        0602 (4 mg)-Given       0858 (4 mg)-Given           OLANZapine (zyPREXA) tablet 10 mg  Dose: 10 mg  Freq: 3 TIMES DAILY PRN Route: PO  PRN Reason: agitation  PRN Comment: associated with psychosis or jacob  Start: 03/08/18 0927   Admin Instructions: Consider lower dose if sedation or hypotension.  Combined IM and PO doses may significantly increase the risk of orthostatic hypotension at 30 mg per day or higher.              Or  OLANZapine (zyPREXA) injection 10 mg  Dose: 10 mg  Freq: 3 TIMES DAILY PRN Route: IM  PRN Reason: agitation  PRN Comment: associated with psychosis or jacob  Start: 03/08/18 0927   Admin Instructions: Not to exceed 30 mg in 24 hours.  Consider lower dose if sedation or hypotension.  Dissolve the contents of the 10 mg vial using 2.1 mL of Sterile Water for Injection to provide a solution containing 5 mg/mL of olanzapine. Withdraw the ordered dose from vial. Use immediately (within 1 hour) after reconstitution. Discard any unused portion.               traZODone (DESYREL) tablet 150 mg  Dose: 150 mg  Freq: AT BEDTIME Route: PO  Start: 03/08/18 2100 2016 (150 mg)-Given        2003 (150 mg)-Given        2012 (150 mg)-Given        2015 (150 mg)-Given        2003 (150 mg)-Given        [ ] 2100           traZODone (DESYREL) tablet 50 mg  Dose: 50 mg  Freq: AT BEDTIME PRN Route: PO  PRN Reason: sleep  Start: 03/08/18 0927   Admin Instructions: May repeat x 1        0031 (50 mg)-Given [C]        2330 (50 mg)-Given        2358 (50 mg)-Given           Future Medications  Medications 03/07/18 03/08/18 03/09/18 03/10/18 03/11/18 03/12/18 03/13/18       lurasidone (LATUDA) tablet 120 mg  Dose: 120 mg  Freq: DAILY WITH BREAKFAST Route: PO  Start: 03/14/18 0800              Completed Medications  Medications 03/07/18 03/08/18 03/09/18 03/10/18 03/11/18 03/12/18 03/13/18         Dose: 100 mg  Freq: ONCE Route: PO  Start: 03/13/18 0800   End: 03/13/18 0828          0828 (100 mg)-Given          Discontinued Medications  Medications 03/07/18 03/08/18 03/09/18 03/10/18 03/11/18 03/12/18 03/13/18         Dose: 500 mg  Freq: 2 TIMES DAILY Route: PO  Start: 03/08/18 2100   End: 03/10/18 1420   Admin Instructions: DO NOT CRUSH.      2016 (500 mg)-Given        0833 (500 mg)-Given       2003 (500 mg)-Given        0832 (500 mg)-Given       1420-Med Discontinued            Dose: 600 mg  Freq: 3 TIMES DAILY Route: PO  Start: 03/09/18 2100   End: 03/10/18 1420      2003 (600 mg)-Given        0832 (600 mg)-Given       1326 (600 mg)-Given       1420-Med Discontinued            Dose: 60 mg  Freq: DAILY WITH BREAKFAST Route: PO  Start: 03/09/18 0800   End: 03/10/18 1420      0833 (60 mg)-Given        0832 (60 mg)-Given       1420-Med Discontinued            Dose: 80 mg  Freq: DAILY WITH BREAKFAST Route: PO  Start: 03/11/18 0800   End: 03/12/18 1323        0844 (80 mg)-Given        0812 (80 mg)-Given       1323-Med Discontinued                 INTERAGENCY TRANSFER FORM - NOTES (H&P, Discharge Summary, Consults, Procedures, Therapies)   3/8/2018                       [facility]: [phone]               History & Physicals      H&P by Bridger Cade NP at 3/8/2018  2:09 PM     Author:  Bridger Cade NP Service:  Psychiatry Author Type:  Nurse Practitioner    Filed:  3/8/2018  2:09 PM Date of Service:  3/8/2018  2:09 PM Creation Time:  3/8/2018  1:39 PM    Status:  Attested :  Bridger Cade NP (Nurse Practitioner)    Cosigner:  Lance Van MD at 3/8/2018  5:30 PM        Attestation signed by Lance Van MD at 3/8/2018  5:30 PM        Physician Attestation   I, Lance Van, have reviewed and discussed with the advanced practice provider their history, physical and plan  "for Chandan Ma. I did not participate in a shared visit by interviewing or examining the patient and this should be billed as an advanced practice provider only visit.    Lance Van  Date of Service (when I saw the patient): I did not personally see this patient today.                               Psychiatric Eval/H&P  Patient Name: Chandan Ma   YOB: 1980  Age: 38 year old  [id number]    Primary Physician: No Ref-Primary, Physician   Completed By: Bridger Cade, VALERIA     CC:  Suicidal thoughts    HPI  Chandan Ma is a 38 year old male who presented via [facility] in [place] with reports of suicidal thoughts and plans to hang himself. Was unable to report trigger but off medications for 2 weeks. Has been self-medicating with marijuana. Previous inpatient stays at [facility], [place], and [place].     Chandan is fairly guarded and become irritable easily. He indicates that he is depressed, thinking of suicide, has deep dark thoughts, is angry with himself and overall feels aggravated. Reports that this has been going on for \"a couple days,\" but it sounds like he has been self-medicating with marijuana for several weeks. Indicates that his marijuana use has increased significantly secondary to symptoms, \"I smoke it all the time, like non-stop.\" Reports that generally he sleeps well when on medications, but struggles some without them. Denies issues with weight, appetite, symptoms of psychosis, and any history of prolonged elevation in mood with disruptive sleep.     He is somewhat defensive about his history and does not understand how discussing any of it is relevant to his current mental health status. He admits to a history of [facility] and skilled nursing time, being on both probation and parole, but refuses to discuss the details. Denies any history of violent behavior or current legal issues. Minimal records available at this time, but there is indicating that previous diagnoses have " "included Episodic Mood disorder, Anxiety Disorder, Polysubstance Dependence, Alcohol dependence, Drug induced mood disorder, Marijuana dependence, BEnzodiazepine Dependence, Impulse control disorder, and Antisocial personality Disorder. There are also previous diagnoses made of Bipolar I Disorder, PTSD, NAVARRO. Previous medications in chart include Prozac, Melatonin, and Geodon.     [facility]     Past Psychiatric History:   Reports previous IP stays for suicidal ideation and previous attempts. Will not discuss attempts. Was seeing a Nurse Practitioner at [facility] for medication management, but was referred to a provider in [place] but apparently missed the appointment. No other outpatient services. Last hospitalization was at [facility] \"last year.\"      Social History:   Residing in an apartment in [place] with a couple roommates, Unemployed with no income. Has GED with a little college. No current legal issues, but history of assisted and [facility] time, probation and parole. Will not discuss details. Records indicate history of theft and credit card fraud. Denies history of violence.      Chemical Use History:   Vague about substance use history, outside of marijuana use. Records indicate that in 2012 he was admitted to a substance abuse unit. He does report previous use of methamphetamine and heroine with sobriety since October 2017.      Family Psychiatric History:   Mother and maternal grandmother are both bipolar. Grandfather committed suicide.         Medical History and ROS  No current outpatient prescriptions on file.     Allergies   Allergen Reactions     Latex      Past Medical History:   Diagnosis Date     Bipolar I disorder, most recent episode depressed (H)     No Comments Provided     Generalized anxiety disorder     No Comments Provided     Ill-defined closed fractures of upper limb     85-86, Right     Methicillin resistant Staphylococcus aureus carrier/suspected carrier     Nov 2012     Posttraumatic " stress disorder     No Comments Provided     Past Surgical History:   Procedure Laterality Date     EXTRACTION(S) DENTAL      No Comments Provided     OTHER SURGICAL HISTORY      [id number],OTHER,inner left groin had an area lanced in 2012         Physical Exam    Constitutional: appears well-developed and well-nourished.   HENT:   Head: Normocephalic and atraumatic.   Right Ear: External ear normal.   Left Ear: External ear normal.   Nose: Nose normal.   Mouth/Throat: External oral area intact. No teeth.   Eyes: Conjunctivae and EOM are normal.   Neck: Normal range of motion.   Cardiovascular: Normal rate.  Pulmonary/Chest: Effort normal. No respiratory distress.    Skin: Dry, intact.    Review of Systems:  Constitution: No weight loss, fever, night sweats  Skin: No rashes, pruritus or open wounds  Neuro: No headaches or seizure activity.  Psych:  See HPI  Eyes: No vision changes.  ENT: No problems chewing or swallowing.   Musculoskeletal: No muscle pain, joint pain or swelling   Respiratory: No cough or dyspnea  Cardiovascular:  No chest pain,  palpitations or fainting  Gastrointestinal:  No abdominal pain, nausea, vomiting or change in bowel habits         MSE/PSYCH  PSYCHIATRIC EXAM  /77  Pulse 69  Temp 97.4  F (36.3  C) (Tympanic)  Resp 16  Ht 1.829 m (6')  Wt 106.4 kg (234 lb 8 oz)  SpO2 97%  BMI 31.8 kg/m2  -Appearance/Behavior: No apparent distress and Disheveled    -Attitude: Guarded, somewhat cooperative  -Motor: normal or unremarkable.  -Gait: Normal.    -Abnormal involuntary movements: None noted.  -Mood: depressed.  -Affect: Appropriate/mood-congruent.  -Speech: Normal volume, rate and content.                 -Thought process/associations: Logical, Linear and Goal directed.  -Thought content: normal .  -Perceptual disturbances: No hallucinations..              -Suicidal/Homicidal Ideation: Passive SI without intent. No HI.   -Judgment: Fair.  -Insight: Adequate.  *Orientation: time, place and  "person.  *Memory: Intact.  *Attention: Good  *Language: fluent, no aphasias, able to repeat phrases and name objects. Vocab intact.  *Fund of information: appropriate for education.  *Cognitive functioning estimate: Average.     Labs:   Preadmission labs available in paper chart. Reviewed with no acute concerns.      Assessment/Impression: This is a 38 year old yo male with a complicated history of mental health, legal, and substance dependence issues. Presents reporting suicidal thoughts and depression over the last couple of days. Has been off of medications for 2 weeks, reporting that he left the bag on the bus and was unable to get them refilled. He would like to get restarted on his medications. Has been self-medicating with excessive use of marijuana. Tells me \"I'm utilizing prevention control - it's better than heroine or meth.\" Will resumed medications as previously prescribed, but dosing will have to be started lower and titrated secondary to the length of time he has been off of them.     Educated regarding medication indications, risks, benefits, side effects, contraindications and possible interactions. Verbally expressed understanding.     DX:  Bipolar I Disorder, per history, currently depressed  PTSD, Chronic  Generalized Anxiety Disorder   Antisocial Personality Disorder    Plan:  Admit to Unit: [address] [place] [address]  Attending: TYLER Erickson CNP  Patient is: Voluntary  Monitor for improvement in mood and response to medications.    Provide a safe environment and therapeutic milieu.   Restart Depakote DR at 500mg twice daily (previously 500 daily and 1000 at bed)  Restart Gabapentin at 300mg three times daily (previously at 600mg three times daily)  Restart Latuda 60mg daily (previously at 120mg daily)  Restart Trazodone 150mg at bed.     Anticipated length of stay: 3-5 days.      TYLER Erickson, CNP[CW1.1]     Revision History        User Key Date/Time User Provider Type Action    > CW1.1 3/8/2018  " "2:09 PM Bridger Cade NP Nurse Practitioner Sign                     Discharge Summaries      Discharge Summaries by Bridger Cade NP at 3/13/2018 10:45 AM     Author:  Bridger Cade NP Service:  Psychiatry Author Type:  Nurse Practitioner    Filed:  3/13/2018 10:45 AM Date of Service:  3/13/2018 10:45 AM Creation Time:  3/13/2018 10:39 AM    Status:  Cosign Needed :  Bridger Cade NP (Nurse Practitioner)    Cosign Required:  Yes             Psychiatric Discharge Summary    Chandan Ma MRN# [id number]   Age: 38 year old YOB: 1980     Date of Admission:  3/8/2018  Date of Discharge:  3/13/2018  Admitting Physician:  Lance Van MD  Discharge Physician:  Bridger Cade NP          Event Leading to Hospitalization and Hospital Stay   HPI  Chandan Ma is a 38 year old male who presented via [facility] in [place] with reports of suicidal thoughts and plans to hang himself. Was unable to report trigger but off medications for 2 weeks. Has been self-medicating with marijuana. Previous inpatient stays at [facility], [place], and [place].      Chandan is fairly guarded and become irritable easily. He indicates that he is depressed, thinking of suicide, has deep dark thoughts, is angry with himself and overall feels aggravated. Reports that this has been going on for \"a couple days,\" but it sounds like he has been self-medicating with marijuana for several weeks. Indicates that his marijuana use has increased significantly secondary to symptoms, \"I smoke it all the time, like non-stop.\" Reports that generally he sleeps well when on medications, but struggles some without them. Denies issues with weight, appetite, symptoms of psychosis, and any history of prolonged elevation in mood with disruptive sleep.      He is somewhat defensive about his history and does not understand how discussing any of it is relevant to his current mental health status. He admits to a history " of [facility] and prison time, being on both probation and parole, but refuses to discuss the details. Denies any history of violent behavior or current legal issues. Minimal records available at this time, but there is indicating that previous diagnoses have included Episodic Mood disorder, Anxiety Disorder, Polysubstance Dependence, Alcohol dependence, Drug induced mood disorder, Marijuana dependence, BEnzodiazepine Dependence, Impulse control disorder, and Antisocial personality Disorder. There are also previous diagnoses made of Bipolar I Disorder, PTSD, NAVARRO. Previous medications in chart include Prozac, Melatonin, and Geodon.      Stay:  Admitted to unit voluntarily. Treated and monitored for improvement in mood. Monitored response to medications. Provided a safe environment and therapeutic milieu. Restarted Depakote DR at 500mg twice daily and titrated back to 500 daily and 1000 at bed. Restarted Gabapentin at 300mg three times daily and titrated back to 600mg three times daily. Was then increased to 900mg three times daily to further target anxiety. Restarted Latuda 60mg daily and titrated back to 120mg daily.  Restarted Trazodone 150mg at bed. Stabilized well. New boarding arranged.     At time of discharge, there is no evidence that patient is in immediate danger of self or others.        DIagnoses:     Bipolar I Disorder, per history, currently depressed  PTSD, Chronic  Generalized Anxiety Disorder   Antisocial Personality Disorder            Labs:[CW1.1]     Results for orders placed or performed during the hospital encounter of 03/08/18   Valproic acid   Result Value Ref Range    Valproic Acid Level 92 50 - 100 mg/L[CW1.2]            Discharge Medications:     Current Discharge Medication List      START taking these medications    Details   hydrOXYzine (ATARAX) 25 MG tablet Take 1-2 tablets (25-50 mg) by mouth every 4 hours as needed for anxiety  Qty: 60 tablet, Refills: 0    Associated Diagnoses: Anxiety          CONTINUE these medications which have CHANGED    Details   !! divalproex sodium delayed-release (DEPAKOTE) 500 MG DR tablet Take 1 tablet (500 mg) by mouth daily Take daily in the morning  Qty: 30 tablet, Refills: 0    Associated Diagnoses: Bipolar I disorder (H)      !! divalproex sodium delayed-release (DEPAKOTE) 500 MG DR tablet Take 2 tablets (1,000 mg) by mouth At Bedtime  Qty: 60 tablet, Refills: 0    Associated Diagnoses: Bipolar I disorder (H)      gabapentin (NEURONTIN) 300 MG capsule Take 3 capsules (900 mg) by mouth 3 times daily  Qty: 270 capsule, Refills: 0    Associated Diagnoses: Anxiety; Bipolar I disorder (H)      traZODone (DESYREL) 150 MG tablet Take 1 tablet (150 mg) by mouth At Bedtime  Qty: 30 tablet, Refills: 0    Associated Diagnoses: Anxiety      lurasidone (LATUDA) 120 MG TABS tablet Take 1 tablet (120 mg) by mouth daily (with breakfast)  Qty: 30 tablet, Refills: 0    Associated Diagnoses: Bipolar I disorder (H)      nicotine polacrilex (NICORETTE) 4 MG lozenge Place 1-2 lozenges (4-8 mg) inside cheek every hour as needed for smoking cessation  Qty: 168 tablet, Refills: 0    Associated Diagnoses: Tobacco dependence syndrome       !! - Potential duplicate medications found. Please discuss with provider.               Psychiatric Examination:   Appearance:  awake, alert and adequately groomed  Attitude:  cooperative  Eye Contact:  good  Mood:  better  Affect:  mood congruent  Speech:  clear, coherent  Psychomotor Behavior:  no evidence of tardive dyskinesia, dystonia, or tics  Thought Process:  logical, linear and goal oriented  Associations:  no loose associations  Thought Content:  no evidence of suicidal ideation or homicidal ideation and no evidence of psychotic thought  Insight:  good  Judgment:  fair  Oriented to:  time, person, and place  Attention Span and Concentration:  intact  Recent and Remote Memory:  intact  Fund of Knowledge: appropriate  Muscle Strength and Tone:  normal  Gait and Station: Normal         Discharge Plan:   Discharge home via taxi. [facility] has a bed for him.     Psychiatry Follow-up:      [facility]- Medication Management: Mi Sandoval Thursday, May 24th at 12:00 and June 21st @ 2:00 pm  Call 24 hours in advance if canceling.   [address]  [place], [place]  Phone: [phone]  Fax: [phone]     [facility] Human Services:  [address]  [place] [address]  [phone]  Toll Free: [phone]  Fax: [phone]     [facility] Services: : Johanna Weir [phone]  [address] [place] [address]  Phone: [phone]      Attestation:  The patient has been seen and evaluated by me,[CW1.1]  Bridger Cade NP[CW1.3]         Discharge Services Provided:    35 minutes spent on discharge services, including:  Final examination of patient.  Review and discussion of Hospital stay.  Instructions for continued outpatient care/goals.  Preparation of discharge records.  Preparation of medications refills and new prescriptions.  Preparation of Applicable referral forms.[CW1.1]      Revision History        User Key Date/Time User Provider Type Action    > CW1.2 3/13/2018 10:45 AM Bridger Cade NP Nurse Practitioner Sign     CW1.3 3/13/2018 10:40 AM Bridger Cade NP Nurse Practitioner      CW1.1 3/13/2018 10:39 AM Bridger Cade NP Nurse Practitioner                   Consult Notes     No notes of this type exist for this encounter.      Progress Notes - Physician (Notes for yesterday and today)     No notes of this type exist for this encounter.      Procedure Notes     No notes of this type exist for this encounter.      Progress Notes - Therapies (Notes from 03/10/18 through 03/13/18)     No notes of this type exist for this encounter.                                          INTERAGENCY TRANSFER FORM - LAB / IMAGING / EKG / EMG  RESULTS   3/8/2018                       [facility]: [phone]            Unresulted Labs     None         Lab Results - 3 Days      Valproic acid [[id number]]  Resulted: 03/13/18 0630, Result status: Final result    Ordering provider: Bridger Cade NP  03/13/18 0001 Resulting lab: [facility]    Specimen Information    Type Source Collected On   Blood  03/13/18 0551          Components       Value Reference Range Flag Lab   Valproic Acid Level 92 50 - 100 mg/L  [facility]            Testing Performed By     Lab - Abbreviation Name Director Address Valid Date Range    210 - HI [facility] Unknown [address] [address] 05/08/15 1057 - Present            Encounter-Level Documents:     There are no encounter-level documents.      Order-Level Documents:     There are no order-level documents.

## 2018-03-08 NOTE — IP AVS SNAPSHOT
MRN:[id number]                      After Visit Summary   3/8/2018    Chandan Ma    MRN: [id number]           Thank you!     Thank you for choosing [facility] for your care. Our goal is always to provide you with excellent care. Hearing back from our patients is one way we can continue to improve our services. Please take a few minutes to complete the written survey that you may receive in the mail after you visit with us. Thank you!        Patient Information     Date Of Birth          1980        Designated Caregiver       Most Recent Value    Caregiver    Will someone help with your care after discharge? no      About your hospital stay     You were admitted on:  March 8, 2018 You last received care in the:  [facility]    You were discharged on:  March 13, 2018       Who to Call     For medical emergencies, please call 911.  For non-urgent questions about your medical care, please call your primary care provider or clinic, None          Attending Provider     Provider Specialty    Lance Van MD Psychiatry    Ruby Boyce NP [facility]       Primary Care Provider Fax #    Physician No Ref-Primary [phone]      Further instructions from your care team       Behavioral Discharge Planning and Instructions    Summary: Chandan is a 38 year-old male who was admitted to [address] [place] [address] with suicidal ideation.    Main Diagnosis: Bipolar I Disorder, per history, currently depressed, PTSD, Chronic, Generalized Anxiety Disorder, Antisocial Personality Disorder    Major Treatments, Procedures and Findings: Stabilize with medications, connect with community programs.    Symptoms to Report: feeling more aggressive, increased confusion, losing more sleep, mood getting worse or thoughts of suicide    Lifestyle Adjustment: Take all medications as prescribed, meet with doctor/ medication provider, out patient therapist, , and [facility] worker as scheduled. Abstain from  alcohol or any unprescribed drugs.    Psychiatry Follow-up:     [facility]: Mi Priestangel Thursday, May 24th at 12:00 and June 21st @ 2:00 pm  Call 24 hours in advance if canceling.   [address]  [place], [place]  Phone: [phone]  Fax: [phone]    [facility] Human Services:  [address]  [place] [address]  [phone]  Toll Free: [phone]  Fax: [phone]    [facility] Services: : Johanna Weir [phone]  [address] [place] [address]  Phone: [phone]    [facility]  PCP-  Alvarado 3/23 @1:10   [address] [place] [address]  Phone: [phone]   Fax: [phone]     Resources:   Crisis Intervention: 635.621.5245 or 098-683-4762 (TTY: 165.642.8147).  Call anytime for help.  National [place] on Mental Illness (www.[place].liliam.org): 503.219.4479 or 107-874-6401.  Alcoholics Anonymous (www.alcoholics-anonymous.org): Check your phone book for your local chapter.  Suicide Awareness Voices of Education (SAVE) (www.save.org): 103-660-YHUW (7705)  National Suicide Prevention Line (www.mentalhealthmn.org): 024-496-BOTU (6487)  Mental Health Consumer/Survivor Network of [place] (www.mhcsn.net): 738.747.1237 or 543-528-3903  Mental Health Association of [place] (www.mentalhealth.org): 715.903.6788 or 077-568-6730    General Medication Instructions:   See your medication sheet(s) for instructions.   Take all medicines as directed.  Make no changes unless your doctor suggests them.   Go to all your doctor visits.  Be sure to have all your required lab tests. This way, your medicines can be refilled on time.  Do not use any drugs not prescribed by your doctor.  Avoid alcohol.    [place] Area:  [facility], Crisis stabilization [facility]- [phone]  [facility] Crisis Line: 1-851.858.3395  [facility]ates [facility] Family [facility]: 368-9716  Sexual Assault Program [facility]  "[facility]: 839.478.2058 or 1-906.862.8842  [place] [facility]: 1-899.207.8995 Ext: 279       Calls answered Mon-Fri-8:00 am--4:30 pm    [place]:  [facility] Family [facility]: 1-107.880.3241  [facility] first call for help: 0-715-169-9462  [facility] Counseling Crisis Center:  ([phone]      [place] Area:  Warm Line: 1-178.647.5316       Calls answered Tuesday--Saturday 4:00 pm--10:00 pm  [facility] Crisis Line - [phone]  [place] [facility] Stabilization 087-130-0663    [place] Statewide:  [place] Crisis and Referral Services: 5-949-694-6497  National Suicide Prevention Lifeline: 4-003-566-TALK (0166)   - msw3qwxn- Text  Life  to [address]  First Call for Help: 2-1-1  [facility] Helpline- 8-532-OZLU-HELP        Pending Results     No orders found from 3/6/2018 to 3/9/2018.            Statement of Approval     Ordered          03/13/18 1039  I have reviewed and agree with all the recommendations and orders detailed in this document.  EFFECTIVE NOW     Approved and electronically signed by:  Bridger Cade NP             Admission Information     Date & Time Provider Department Dept. Phone    3/8/2018 Ruby Boyce NP [facility] [phone]      Your Vitals Were     Blood Pressure Pulse Temperature Respirations Height Weight    110/83 82 96  F (35.6  C) (Tympanic) 16 1.829 m (6') 109.6 kg (241 lb 9.6 oz)    Pulse Oximetry BMI (Body Mass Index)                98% 32.77 kg/m2          [facility] Information     [facility] lets you send messages to your doctor, view your test results, renew your prescriptions, schedule appointments and more. To sign up, go to [email or web address] . Click on \"Log in\" on the left side of the screen, which will take you to the Welcome page. Then click on \"Sign up Now\" on the right side of the page.     You will be asked to enter the access code listed below, as well as some personal information. Please follow the directions to create your username and " password.     Your access code is: [email or web address]  Expires: 2018 11:32 AM     Your access code will  in 90 days. If you need help or a new code, please call your [facility] clinic or [phone].        Care EveryWhere ID     This is your Care EveryWhere ID. This could be used by other organizations to access your [facility] medical records  [id number]        Equal Access to Services     ALBERTO CARD : Hadii jasmin ku hadasho Soomaali, waaxda luqadaha, qaybta kaalmada adeegyada, karri beasleyin haysanfordn laurie anisanathan mcneal . So [facility] [phone].    ATENCIÓN: Si habla destiny, tiene a allen disposición servicios gratuitos de asistencia lingüística. Airamame al [phone].    We comply with applicable federal civil rights laws and [place] laws. We do not discriminate on the basis of race, color, national origin, age, disability, sex, sexual orientation, or gender identity.               Review of your medicines      START taking        Dose / Directions    hydrOXYzine 25 MG tablet   Commonly known as:  ATARAX   Used for:  Anxiety        Dose:  25-50 mg   Take 1-2 tablets (25-50 mg) by mouth every 4 hours as needed for anxiety   Quantity:  60 tablet   Refills:  0         CONTINUE these medicines which may have CHANGED, or have new prescriptions. If we are uncertain of the size of tablets/capsules you have at home, strength may be listed as something that might have changed.        Dose / Directions    gabapentin 300 MG capsule   Commonly known as:  NEURONTIN   This may have changed:    - medication strength  - how much to take   Used for:  Anxiety, Bipolar I disorder (H)        Dose:  900 mg   Take 3 capsules (900 mg) by mouth 3 times daily   Quantity:  270 capsule   Refills:  0       lurasidone 120 MG Tabs tablet   Commonly known as:  LATUDA   This may have changed:    - medication strength  - when to take this  - additional instructions   Used for:  Bipolar I disorder (H)        Dose:  120 mg   Start taking on:   3/14/2018   Take 1 tablet (120 mg) by mouth daily (with breakfast)   Quantity:  30 tablet   Refills:  0       nicotine polacrilex 4 MG lozenge   Commonly known as:  NICORETTE   This may have changed:  how much to take   Used for:  Tobacco dependence syndrome        Dose:  4-8 mg   Place 1-2 lozenges (4-8 mg) inside cheek every hour as needed for smoking cessation   Quantity:  168 tablet   Refills:  0       traZODone 150 MG tablet   Commonly known as:  DESYREL   This may have changed:  medication strength   Used for:  Anxiety        Dose:  150 mg   Take 1 tablet (150 mg) by mouth At Bedtime   Quantity:  30 tablet   Refills:  0         CONTINUE these medicines which have NOT CHANGED        Dose / Directions    * divalproex sodium delayed-release 500 MG DR tablet   Commonly known as:  DEPAKOTE   Used for:  Bipolar I disorder (H)        Dose:  500 mg   Take 1 tablet (500 mg) by mouth daily Take daily in the morning   Quantity:  30 tablet   Refills:  0       * divalproex sodium delayed-release 500 MG DR tablet   Commonly known as:  DEPAKOTE   Used for:  Bipolar I disorder (H)        Dose:  1000 mg   Take 2 tablets (1,000 mg) by mouth At Bedtime   Quantity:  60 tablet   Refills:  0       * Notice:  This list has 2 medication(s) that are the same as other medications prescribed for you. Read the directions carefully, and ask your doctor or other care provider to review them with you.         Where to get your medicines      These medications were sent to [facility] - [facility]UD[facility] DANIELSON - [address]  [address] [address]     Phone:  [phone]     divalproex sodium delayed-release 500 MG DR tablet    divalproex sodium delayed-release 500 MG DR tablet    gabapentin 300 MG capsule    hydrOXYzine 25 MG tablet    lurasidone 120 MG Tabs tablet    nicotine polacrilex 4 MG lozenge    traZODone 150 MG tablet                Protect others around you: Learn how to safely use, store and throw away your  medicines at www.disposemymeds.org.             Medication List: This is a list of all your medications and when to take them. Check marks below indicate your daily home schedule. Keep this list as a reference.      Medications           Morning Afternoon Evening Bedtime As Needed    * divalproex sodium delayed-release 500 MG DR tablet   Commonly known as:  DEPAKOTE   Take 1 tablet (500 mg) by mouth daily Take daily in the morning   Last time this was given:  500 mg on 3/13/2018  8:27 AM                                * divalproex sodium delayed-release 500 MG DR tablet   Commonly known as:  DEPAKOTE   Take 2 tablets (1,000 mg) by mouth At Bedtime   Last time this was given:  500 mg on 3/13/2018  8:27 AM                                gabapentin 300 MG capsule   Commonly known as:  NEURONTIN   Take 3 capsules (900 mg) by mouth 3 times daily   Last time this was given:  900 mg on 3/13/2018  8:27 AM                                hydrOXYzine 25 MG tablet   Commonly known as:  ATARAX   Take 1-2 tablets (25-50 mg) by mouth every 4 hours as needed for anxiety   Last time this was given:  50 mg on 3/13/2018 10:43 AM                                lurasidone 120 MG Tabs tablet   Commonly known as:  LATUDA   Take 1 tablet (120 mg) by mouth daily (with breakfast)   Start taking on:  3/14/2018   Last time this was given:  100 mg on 3/13/2018  8:28 AM                                nicotine polacrilex 4 MG lozenge   Commonly known as:  NICORETTE   Place 1-2 lozenges (4-8 mg) inside cheek every hour as needed for smoking cessation   Last time this was given:  4 mg on 3/13/2018  8:58 AM                                traZODone 150 MG tablet   Commonly known as:  DESYREL   Take 1 tablet (150 mg) by mouth At Bedtime   Last time this was given:  50 mg on 3/12/2018 11:58 PM                                * Notice:  This list has 2 medication(s) that are the same as other medications prescribed for you. Read the directions carefully,  and ask your doctor or other care provider to review them with you.

## 2018-03-08 NOTE — H&P
"Psychiatric Eval/H&P  Patient Name: Chandan Ma   YOB: 1980  Age: 38 year old  [id number]    Primary Physician: No Ref-Primary, Physician   Completed By: Bridger Cade, VALERIA     CC:  Suicidal thoughts    HPI  Chandan Ma is a 38 year old male who presented via [facility] in [place] with reports of suicidal thoughts and plans to hang himself. Was unable to report trigger but off medications for 2 weeks. Has been self-medicating with marijuana. Previous inpatient stays at [facility], [facility], and [facility].     Chandan is fairly guarded and become irritable easily. He indicates that he is depressed, thinking of suicide, has deep dark thoughts, is angry with himself and overall feels aggravated. Reports that this has been going on for \"a couple days,\" but it sounds like he has been self-medicating with marijuana for several weeks. Indicates that his marijuana use has increased significantly secondary to symptoms, \"I smoke it all the time, like non-stop.\" Reports that generally he sleeps well when on medications, but struggles some without them. Denies issues with weight, appetite, symptoms of psychosis, and any history of prolonged elevation in mood with disruptive sleep.     He is somewhat defensive about his history and does not understand how discussing any of it is relevant to his current mental health status. He admits to a history of snf and care home time, being on both probation and parole, but refuses to discuss the details. Denies any history of violent behavior or current legal issues. Minimal records available at this time, but there is indicating that previous diagnoses have included Episodic Mood disorder, Anxiety Disorder, Polysubstance Dependence, Alcohol dependence, Drug induced mood disorder, Marijuana dependence, BEnzodiazepine Dependence, Impulse control disorder, and Antisocial personality Disorder. There are also previous diagnoses made of Bipolar I Disorder, PTSD, NAVARRO. " "Previous medications in chart include Prozac, Melatonin, and Geodon.     [facility] [place]     Past Psychiatric History:   Reports previous IP stays for suicidal ideation and previous attempts. Will not discuss attempts. Was seeing a Nurse Practitioner at [facility] for medication management, but was referred to a provider in [place] but apparently missed the appointment. No other outpatient services. Last hospitalization was at [facility] \"last year.\"      Social History:   Residing in an apartment in [place] with a couple roommates, Unemployed with no income. Has GED with a little college. No current legal issues, but history of [facility] and intermediate time, probation and parole. Will not discuss details. Records indicate history of theft and credit card fraud. Denies history of violence.      Chemical Use History:   Vague about substance use history, outside of marijuana use. Records indicate that in 2012 he was admitted to a substance abuse unit. He does report previous use of methamphetamine and heroine with sobriety since October 2017.      Family Psychiatric History:   Mother and maternal grandmother are both bipolar. Grandfather committed suicide.         Medical History and ROS  No current outpatient prescriptions on file.     Allergies   Allergen Reactions     Latex      Past Medical History:   Diagnosis Date     Bipolar I disorder, most recent episode depressed (H)     No Comments Provided     Generalized anxiety disorder     No Comments Provided     Ill-defined closed fractures of upper limb     85-86, Right     Methicillin resistant Staphylococcus aureus carrier/suspected carrier     Nov 2012     Posttraumatic stress disorder     No Comments Provided     Past Surgical History:   Procedure Laterality Date     EXTRACTION(S) DENTAL      No Comments Provided     OTHER SURGICAL HISTORY      [id number],OTHER,inner left groin had an area lanced in 2012         Physical Exam    Constitutional: appears well-developed and " well-nourished.   HENT:   Head: Normocephalic and atraumatic.   Right Ear: External ear normal.   Left Ear: External ear normal.   Nose: Nose normal.   Mouth/Throat: External oral area intact. No teeth.   Eyes: Conjunctivae and EOM are normal.   Neck: Normal range of motion.   Cardiovascular: Normal rate.  Pulmonary/Chest: Effort normal. No respiratory distress.    Skin: Dry, intact.    Review of Systems:  Constitution: No weight loss, fever, night sweats  Skin: No rashes, pruritus or open wounds  Neuro: No headaches or seizure activity.  Psych:  See HPI  Eyes: No vision changes.  ENT: No problems chewing or swallowing.   Musculoskeletal: No muscle pain, joint pain or swelling   Respiratory: No cough or dyspnea  Cardiovascular:  No chest pain,  palpitations or fainting  Gastrointestinal:  No abdominal pain, nausea, vomiting or change in bowel habits         MSE/PSYCH  PSYCHIATRIC EXAM  /77  Pulse 69  Temp 97.4  F (36.3  C) (Tympanic)  Resp 16  Ht 1.829 m (6')  Wt 106.4 kg (234 lb 8 oz)  SpO2 97%  BMI 31.8 kg/m2  -Appearance/Behavior: No apparent distress and Disheveled    -Attitude: Guarded, somewhat cooperative  -Motor: normal or unremarkable.  -Gait: Normal.    -Abnormal involuntary movements: None noted.  -Mood: depressed.  -Affect: Appropriate/mood-congruent.  -Speech: Normal volume, rate and content.                 -Thought process/associations: Logical, Linear and Goal directed.  -Thought content: normal .  -Perceptual disturbances: No hallucinations..              -Suicidal/Homicidal Ideation: Passive SI without intent. No HI.   -Judgment: Fair.  -Insight: Adequate.  *Orientation: time, place and person.  *Memory: Intact.  *Attention: Good  *Language: fluent, no aphasias, able to repeat phrases and name objects. Vocab intact.  *Fund of information: appropriate for education.  *Cognitive functioning estimate: Average.     Labs:   Preadmission labs available in paper chart. Reviewed with no acute  "concerns.      Assessment/Impression: This is a 38 year old yo male with a complicated history of mental health, legal, and substance dependence issues. Presents reporting suicidal thoughts and depression over the last couple of days. Has been off of medications for 2 weeks, reporting that he left the bag on the bus and was unable to get them refilled. He would like to get restarted on his medications. Has been self-medicating with excessive use of marijuana. Tells me \"I'm utilizing prevention control - it's better than heroine or meth.\" Will resumed medications as previously prescribed, but dosing will have to be started lower and titrated secondary to the length of time he has been off of them.     Educated regarding medication indications, risks, benefits, side effects, contraindications and possible interactions. Verbally expressed understanding.     DX:  Bipolar I Disorder, per history, currently depressed  PTSD, Chronic  Generalized Anxiety Disorder   Antisocial Personality Disorder    Plan:  Admit to Unit: [address] [place] [address]  Attending: TYLER Erickson CNP  Patient is: Voluntary  Monitor for improvement in mood and response to medications.    Provide a safe environment and therapeutic milieu.   Restart Depakote DR at 500mg twice daily (previously 500 daily and 1000 at bed)  Restart Gabapentin at 300mg three times daily (previously at 600mg three times daily)  Restart Latuda 60mg daily (previously at 120mg daily)  Restart Trazodone 150mg at bed.     Anticipated length of stay: 3-5 days.      TYLER Erickson, JOSEPH  "

## 2018-03-08 NOTE — IP AVS SNAPSHOT
` `           [facility]: [phone]                 INTERAGENCY TRANSFER FORM - NOTES (H&P, Discharge Summary, Consults, Procedures, Therapies)   3/8/2018                    Hospital Admission Date: 3/8/2018  CHANDAN MARKS   : 1980  Sex: Male        Patient PCP Information     Provider PCP Type    Physician No Ref-Primary General         History & Physicals      H&P by Bridger Cade NP at 3/8/2018  2:09 PM     Author:  Bridger Cade NP Service:  Psychiatry Author Type:  Nurse Practitioner    Filed:  3/8/2018  2:09 PM Date of Service:  3/8/2018  2:09 PM Creation Time:  3/8/2018  1:39 PM    Status:  Attested :  Bridger Cade NP (Nurse Practitioner)    Cosigner:  Lance Van MD at 3/8/2018  5:30 PM        Attestation signed by Lance Van MD at 3/8/2018  5:30 PM        Physician Attestation   I, Lance Van, have reviewed and discussed with the advanced practice provider their history, physical and plan for Chandan Marks. I did not participate in a shared visit by interviewing or examining the patient and this should be billed as an advanced practice provider only visit.    Lance Vna  Date of Service (when I saw the patient): I did not personally see this patient today.                               Psychiatric Eval/H&P  Patient Name: Chandan Marks   YOB: 1980  Age: 38 year old  [id number]    Primary Physician: No Ref-Primary, Physician   Completed By: Bridger Cade NP     CC:  Suicidal thoughts    HPI  Chandan Marks is a 38 year old male who presented via [facility] in [place] with reports of suicidal thoughts and plans to hang himself. Was unable to report trigger but off medications for 2 weeks. Has been self-medicating with marijuana. Previous inpatient stays at [facility], [place], and [place].     Chandan is fairly guarded and become irritable easily. He indicates that he is depressed, thinking of suicide, has deep dark thoughts,  "is angry with himself and overall feels aggravated. Reports that this has been going on for \"a couple days,\" but it sounds like he has been self-medicating with marijuana for several weeks. Indicates that his marijuana use has increased significantly secondary to symptoms, \"I smoke it all the time, like non-stop.\" Reports that generally he sleeps well when on medications, but struggles some without them. Denies issues with weight, appetite, symptoms of psychosis, and any history of prolonged elevation in mood with disruptive sleep.     He is somewhat defensive about his history and does not understand how discussing any of it is relevant to his current mental health status. He admits to a history of senior care and [facility] time, being on both probation and parole, but refuses to discuss the details. Denies any history of violent behavior or current legal issues. Minimal records available at this time, but there is indicating that previous diagnoses have included Episodic Mood disorder, Anxiety Disorder, Polysubstance Dependence, Alcohol dependence, Drug induced mood disorder, Marijuana dependence, BEnzodiazepine Dependence, Impulse control disorder, and Antisocial personality Disorder. There are also previous diagnoses made of Bipolar I Disorder, PTSD, NAVARRO. Previous medications in chart include Prozac, Melatonin, and Geodon.     [facility]     Past Psychiatric History:   Reports previous IP stays for suicidal ideation and previous attempts. Will not discuss attempts. Was seeing a Nurse Practitioner at [facility] for medication management, but was referred to a provider in [place] but apparently missed the appointment. No other outpatient services. Last hospitalization was at [facility] \"last year.\"      Social History:   Residing in an apartment in [place] with a couple roommates, Unemployed with no income. Has GED with a little college. No current legal issues, but history of senior care and [facility] time, probation and parole. " Will not discuss details. Records indicate history of theft and credit card fraud. Denies history of violence.      Chemical Use History:   Vague about substance use history, outside of marijuana use. Records indicate that in 2012 he was admitted to a substance abuse unit. He does report previous use of methamphetamine and heroine with sobriety since October 2017.      Family Psychiatric History:   Mother and maternal grandmother are both bipolar. Grandfather committed suicide.         Medical History and ROS  No current outpatient prescriptions on file.     Allergies   Allergen Reactions     Latex      Past Medical History:   Diagnosis Date     Bipolar I disorder, most recent episode depressed (H)     No Comments Provided     Generalized anxiety disorder     No Comments Provided     Ill-defined closed fractures of upper limb     85-86, Right     Methicillin resistant Staphylococcus aureus carrier/suspected carrier     Nov 2012     Posttraumatic stress disorder     No Comments Provided     Past Surgical History:   Procedure Laterality Date     EXTRACTION(S) DENTAL      No Comments Provided     OTHER SURGICAL HISTORY      [id number],OTHER,inner left groin had an area lanced in 2012         Physical Exam    Constitutional: appears well-developed and well-nourished.   HENT:   Head: Normocephalic and atraumatic.   Right Ear: External ear normal.   Left Ear: External ear normal.   Nose: Nose normal.   Mouth/Throat: External oral area intact. No teeth.   Eyes: Conjunctivae and EOM are normal.   Neck: Normal range of motion.   Cardiovascular: Normal rate.  Pulmonary/Chest: Effort normal. No respiratory distress.    Skin: Dry, intact.    Review of Systems:  Constitution: No weight loss, fever, night sweats  Skin: No rashes, pruritus or open wounds  Neuro: No headaches or seizure activity.  Psych:  See HPI  Eyes: No vision changes.  ENT: No problems chewing or swallowing.   Musculoskeletal: No muscle pain, joint pain or swelling  "  Respiratory: No cough or dyspnea  Cardiovascular:  No chest pain,  palpitations or fainting  Gastrointestinal:  No abdominal pain, nausea, vomiting or change in bowel habits         MSE/PSYCH  PSYCHIATRIC EXAM  /77  Pulse 69  Temp 97.4  F (36.3  C) (Tympanic)  Resp 16  Ht 1.829 m (6')  Wt 106.4 kg (234 lb 8 oz)  SpO2 97%  BMI 31.8 kg/m2  -Appearance/Behavior: No apparent distress and Disheveled    -Attitude: Guarded, somewhat cooperative  -Motor: normal or unremarkable.  -Gait: Normal.    -Abnormal involuntary movements: None noted.  -Mood: depressed.  -Affect: Appropriate/mood-congruent.  -Speech: Normal volume, rate and content.                 -Thought process/associations: Logical, Linear and Goal directed.  -Thought content: normal .  -Perceptual disturbances: No hallucinations..              -Suicidal/Homicidal Ideation: Passive SI without intent. No HI.   -Judgment: Fair.  -Insight: Adequate.  *Orientation: time, place and person.  *Memory: Intact.  *Attention: Good  *Language: fluent, no aphasias, able to repeat phrases and name objects. Vocab intact.  *Fund of information: appropriate for education.  *Cognitive functioning estimate: Average.     Labs:   Preadmission labs available in paper chart. Reviewed with no acute concerns.      Assessment/Impression: This is a 38 year old yo male with a complicated history of mental health, legal, and substance dependence issues. Presents reporting suicidal thoughts and depression over the last couple of days. Has been off of medications for 2 weeks, reporting that he left the bag on the bus and was unable to get them refilled. He would like to get restarted on his medications. Has been self-medicating with excessive use of marijuana. Tells me \"I'm utilizing prevention control - it's better than heroine or meth.\" Will resumed medications as previously prescribed, but dosing will have to be started lower and titrated secondary to the length of time he " has been off of them.     Educated regarding medication indications, risks, benefits, side effects, contraindications and possible interactions. Verbally expressed understanding.     DX:  Bipolar I Disorder, per history, currently depressed  PTSD, Chronic  Generalized Anxiety Disorder   Antisocial Personality Disorder    Plan:  Admit to Unit: [address] [place] [address]  Attending: TYLER Erickson CNP  Patient is: Voluntary  Monitor for improvement in mood and response to medications.    Provide a safe environment and therapeutic milieu.   Restart Depakote DR at 500mg twice daily (previously 500 daily and 1000 at bed)  Restart Gabapentin at 300mg three times daily (previously at 600mg three times daily)  Restart Latuda 60mg daily (previously at 120mg daily)  Restart Trazodone 150mg at bed.     Anticipated length of stay: 3-5 days.      TYLER Erickson CNP[CW1.1]     Revision History        User Key Date/Time User Provider Type Action    > CW1.1 3/8/2018  2:09 PM Bridger Cade NP Nurse Practitioner Sign                     Discharge Summaries      Discharge Summaries by Bridger Cade NP at 3/13/2018 10:45 AM     Author:  Bridger Cade NP Service:  Psychiatry Author Type:  Nurse Practitioner    Filed:  3/13/2018 10:45 AM Date of Service:  3/13/2018 10:45 AM Creation Time:  3/13/2018 10:39 AM    Status:  Cosign Needed :  Bridger Cade NP (Nurse Practitioner)    Cosign Required:  Yes             Psychiatric Discharge Summary    Chandan Ma MRN# [id number]   Age: 38 year old YOB: 1980     Date of Admission:  3/8/2018  Date of Discharge:  3/13/2018  Admitting Physician:  Lance Van MD  Discharge Physician:  Bridger Cade NP          Event Leading to Hospitalization and Hospital Stay   HPI  Chandan Ma is a 38 year old male who presented via [facility] in [place] with reports of suicidal thoughts and plans to hang himself. Was unable to report trigger but off medications for 2  "weeks. Has been self-medicating with marijuana. Previous inpatient stays at [facility], [place], and [place].      Chandan is fairly guarded and become irritable easily. He indicates that he is depressed, thinking of suicide, has deep dark thoughts, is angry with himself and overall feels aggravated. Reports that this has been going on for \"a couple days,\" but it sounds like he has been self-medicating with marijuana for several weeks. Indicates that his marijuana use has increased significantly secondary to symptoms, \"I smoke it all the time, like non-stop.\" Reports that generally he sleeps well when on medications, but struggles some without them. Denies issues with weight, appetite, symptoms of psychosis, and any history of prolonged elevation in mood with disruptive sleep.      He is somewhat defensive about his history and does not understand how discussing any of it is relevant to his current mental health status. He admits to a history of retirement and intermediate time, being on both probation and parole, but refuses to discuss the details. Denies any history of violent behavior or current legal issues. Minimal records available at this time, but there is indicating that previous diagnoses have included Episodic Mood disorder, Anxiety Disorder, Polysubstance Dependence, Alcohol dependence, Drug induced mood disorder, Marijuana dependence, BEnzodiazepine Dependence, Impulse control disorder, and Antisocial personality Disorder. There are also previous diagnoses made of Bipolar I Disorder, PTSD, NAVARRO. Previous medications in chart include Prozac, Melatonin, and Geodon.      Stay:  Admitted to unit voluntarily. Treated and monitored for improvement in mood. Monitored response to medications. Provided a safe environment and therapeutic milieu. Restarted Depakote DR at 500mg twice daily and titrated back to 500 daily and 1000 at bed. Restarted Gabapentin at 300mg three times daily and titrated back to 600mg three times " daily. Was then increased to 900mg three times daily to further target anxiety. Restarted Latuda 60mg daily and titrated back to 120mg daily.  Restarted Trazodone 150mg at bed. Stabilized well. New boarding arranged.     At time of discharge, there is no evidence that patient is in immediate danger of self or others.        DIagnoses:     Bipolar I Disorder, per history, currently depressed  PTSD, Chronic  Generalized Anxiety Disorder   Antisocial Personality Disorder            Labs:[CW1.1]     Results for orders placed or performed during the hospital encounter of 03/08/18   Valproic acid   Result Value Ref Range    Valproic Acid Level 92 50 - 100 mg/L[CW1.2]            Discharge Medications:     Current Discharge Medication List      START taking these medications    Details   hydrOXYzine (ATARAX) 25 MG tablet Take 1-2 tablets (25-50 mg) by mouth every 4 hours as needed for anxiety  Qty: 60 tablet, Refills: 0    Associated Diagnoses: Anxiety         CONTINUE these medications which have CHANGED    Details   !! divalproex sodium delayed-release (DEPAKOTE) 500 MG DR tablet Take 1 tablet (500 mg) by mouth daily Take daily in the morning  Qty: 30 tablet, Refills: 0    Associated Diagnoses: Bipolar I disorder (H)      !! divalproex sodium delayed-release (DEPAKOTE) 500 MG DR tablet Take 2 tablets (1,000 mg) by mouth At Bedtime  Qty: 60 tablet, Refills: 0    Associated Diagnoses: Bipolar I disorder (H)      gabapentin (NEURONTIN) 300 MG capsule Take 3 capsules (900 mg) by mouth 3 times daily  Qty: 270 capsule, Refills: 0    Associated Diagnoses: Anxiety; Bipolar I disorder (H)      traZODone (DESYREL) 150 MG tablet Take 1 tablet (150 mg) by mouth At Bedtime  Qty: 30 tablet, Refills: 0    Associated Diagnoses: Anxiety      lurasidone (LATUDA) 120 MG TABS tablet Take 1 tablet (120 mg) by mouth daily (with breakfast)  Qty: 30 tablet, Refills: 0    Associated Diagnoses: Bipolar I disorder (H)      nicotine polacrilex  (NICORETTE) 4 MG lozenge Place 1-2 lozenges (4-8 mg) inside cheek every hour as needed for smoking cessation  Qty: 168 tablet, Refills: 0    Associated Diagnoses: Tobacco dependence syndrome       !! - Potential duplicate medications found. Please discuss with provider.               Psychiatric Examination:   Appearance:  awake, alert and adequately groomed  Attitude:  cooperative  Eye Contact:  good  Mood:  better  Affect:  mood congruent  Speech:  clear, coherent  Psychomotor Behavior:  no evidence of tardive dyskinesia, dystonia, or tics  Thought Process:  logical, linear and goal oriented  Associations:  no loose associations  Thought Content:  no evidence of suicidal ideation or homicidal ideation and no evidence of psychotic thought  Insight:  good  Judgment:  fair  Oriented to:  time, person, and place  Attention Span and Concentration:  intact  Recent and Remote Memory:  intact  Fund of Knowledge: appropriate  Muscle Strength and Tone: normal  Gait and Station: Normal         Discharge Plan:   Discharge home via taxi. [facility] has a [facility] for him.     Psychiatry Follow-up:      [facility]- Medication Management: Mi Sandoval Thursday, May 24th at 12:00 and June 21st @ 2:00 pm  Call 24 hours in advance if canceling.   [address] [place] [address] [address]  [place], [place]  Phone: [phone]  Fax: [phone]     [facility]:  [address] [place] [address] [address]  [place] [address]  [phone]  Toll Free: [phone]  Fax: [phone]     [facility] and [facility]: : Johanna Weir [phone]  [address] [place] [address]  Phone: [phone]      Attestation:  The patient has been seen and evaluated by me,[CW1.1]  Bridger Cade NP[CW1.3]         Discharge Services Provided:    35 minutes spent on discharge services, including:  Final examination of patient.  Review and discussion of Hospital stay.  Instructions for  "continued outpatient care/goals.  Preparation of discharge records.  Preparation of medications refills and new prescriptions.  Preparation of Applicable referral forms.[CW1.1]      Revision History        User Key Date/Time User Provider Type Action    > CW1.2 3/13/2018 10:45 AM Bridger Cade NP Nurse Practitioner Sign     CW1.3 3/13/2018 10:40 AM Bridger Cade NP Nurse Practitioner      CW1.1 3/13/2018 10:39 AM Bridger Cade NP Nurse Practitioner                   Consult Notes     No notes of this type exist for this encounter.         Progress Notes - Physician (Notes from 03/10/18 through 03/13/18)      Progress Notes by Bridger Cade NP at 3/12/2018  1:23 PM     Author:  Bridger Cade NP Service:  Psychiatry Author Type:  Nurse Practitioner    Filed:  3/12/2018  1:34 PM Date of Service:  3/12/2018  1:23 PM Creation Time:  3/12/2018  1:23 PM    Status:  Signed :  Bridger Cade NP (Nurse Practitioner)         [facility]  Psychiatric Progress Note      Impression:     Chandan Ma is a 38 year old male who presented via [facility] in [place] with reports of suicidal thoughts and plans to hang himself. Was unable to report trigger but off medications for 2 weeks. Has been self-medicating with marijuana. Previous inpatient stays at [facility], [place], and [place].     Tired today. Reports he slept poorly last night. Indicates, \"that's my life. Either I sleep a lot or my sleep sucks. I'm always tired since I was a kid.\" He was provided Trazodone last night sometime after midnight. Did not appear to help. Continues to report passive SI. Denies intent. Reports mood as \"Eh. Not great.\" Denies psychosis.     Is wanting to go to a [facility] as he can no longer afford rent. His CM is okay with this. He is under commitment through [facility]. His medical insurance has lapsed and financial did come up to help him get set up with MA.            DIagnoses: "     Bipolar I Disorder, per history, currently depressed  PTSD, Chronic  Generalized Anxiety Disorder   Antisocial Personality Disorder    Attestation:  Patient has been seen and evaluated by me,  Bridger Cade NP          Interim History:   The patient's care was discussed with the treatment team and chart notes were reviewed.          Medications:   I have reviewed this patient's current medications  Prescription Medications as of 3/12/2018             Lurasidone HCl (LATUDA PO) Take 120 mg by mouth daily Take daily in the morning with breakfast    divalproex sodium delayed-release (DEPAKOTE) 500 MG DR tablet Take 500 mg by mouth daily Take daily in the morning    divalproex sodium delayed-release (DEPAKOTE) 500 MG DR tablet Take 1,000 mg by mouth At Bedtime    TRAZODONE HCL PO Take 150 mg by mouth At Bedtime    GABAPENTIN PO Take 600 mg by mouth 3 times daily    nicotine polacrilex (NICORETTE) 4 MG lozenge Place 8 mg inside cheek every hour as needed for smoking cessation      Facility Administered Medications as of 3/12/2018             lurasidone (LATUDA) tablet 100 mg Starting on 3/13/2018. Take 100 mg by mouth once    lurasidone (LATUDA) tablet 120 mg Starting on 3/13/2018. Take 3 tablets (120 mg) by mouth daily (with breakfast)    gabapentin (NEURONTIN) capsule 900 mg Take 3 capsules (900 mg) by mouth 3 times daily    divalproex sodium delayed-release (DEPAKOTE) DR tablet 500 mg Take 1 tablet (500 mg) by mouth daily    divalproex sodium delayed-release (DEPAKOTE SPRINKLE) DR capsule 1,000 mg Take 8 capsules (1,000 mg) by mouth At Bedtime    lurasidone (LATUDA) tablet 80 mg (Discontinued) Take 2 tablets (80 mg) by mouth daily (with breakfast)    acetaminophen (TYLENOL) tablet 650 mg Take 2 tablets (650 mg) by mouth every 4 hours as needed for mild pain    alum & mag hydroxide-simethicone (MYLANTA ES/MAALOX  ES) suspension 30 mL Take 30 mLs by mouth every 4 hours as needed for indigestion    magnesium  "hydroxide (MILK OF MAGNESIA) suspension 30 mL Take 30 mLs by mouth nightly as needed for constipation    bisacodyl (DULCOLAX) Suppository 10 mg Place 1 suppository (10 mg) rectally daily as needed for constipation    traZODone (DESYREL) tablet 50 mg Take 1 tablet (50 mg) by mouth nightly as needed for sleep    hydrOXYzine (ATARAX) tablet 25-50 mg Take 1-2 tablets (25-50 mg) by mouth every 4 hours as needed for anxiety    OLANZapine (zyPREXA) tablet 10 mg Take 1 tablet (10 mg) by mouth 3 times daily as needed for agitation (associated with psychosis or jacob)    Linked Group 1:  \"Or\" Linked Group Details     OLANZapine (zyPREXA) injection 10 mg Inject 10 mg into the muscle 3 times daily as needed for agitation (associated with psychosis or jacob)    Linked Group 1:  \"Or\" Linked Group Details     traZODone (DESYREL) tablet 150 mg Take 1 tablet (150 mg) by mouth At Bedtime    nicotine polacrilex lozenge 4-8 mg Place 1-2 lozenges (4-8 mg) inside cheek every hour as needed for smoking cessation          10 point ROS negative        Allergies:     Allergies   Allergen Reactions     Latex             Psychiatric Examination:   /68  Pulse 83  Temp 97.5  F (36.4  C) (Tympanic)  Resp 18  Ht 1.829 m (6')  Wt 109.6 kg (241 lb 9.6 oz)  SpO2 97%  BMI 32.77 kg/m2  Weight is 241 lbs 9.6 oz  Body mass index is 32.77 kg/(m^2).    Appearance:  awake, alert and adequately groomed  Attitude:  cooperative  Eye Contact:  fair  Mood:  depressed  Affect:  appropriate and in normal range  Speech:  clear, coherent  Psychomotor Behavior:  no evidence of tardive dyskinesia, dystonia, or tics  Thought Process:  logical, linear and goal oriented  Associations:  no loose associations  Thought Content:  no evidence of psychotic thought and passive suicidal ideation present  Insight:  fair  Judgment:  fair  Oriented to:  time, person, and place  Attention Span and Concentration:  intact  Recent and Remote Memory:  intact  Fund of " "Knowledge: appropriate  Muscle Strength and Tone: normal  Gait and Station: Normal             Labs:   No labs today           Plan:   Continue HS depakote to 1000mg. Obtain level 3/13/ in AM.   Continue Gabapentin to 900mg tid.  Will increase Latuda to 100mg tomorrow and then 120mg on 3/14.   [facility] - waiting on possible [facility] placement. Working with  out of [facility].[CW1.1]      Revision History        User Key Date/Time User Provider Type Action    > CW1.1 3/12/2018  1:34 PM Bridger Cade NP Nurse Practitioner Sign            Progress Notes by Bridger Cade NP at 3/10/2018  2:21 PM     Author:  Bridger Cade NP Service:  Psychiatry Author Type:  Nurse Practitioner    Filed:  3/10/2018  2:28 PM Date of Service:  3/10/2018  2:21 PM Creation Time:  3/10/2018  2:21 PM    Status:  Signed :  Bridger Cade NP (Nurse Practitioner)         [facility]  Psychiatric Progress Note      Impression:     Chandan Ma is a 38 year old male who presented via [facility] in [place] with reports of suicidal thoughts and plans to hang himself. Was unable to report trigger but off medications for 2 weeks. Has been self-medicating with marijuana. Previous inpatient stays at [facility], [place], and [place].     Chandan continues to report passive SI and depressed mood. Denies any intent to harm himself. Denies HI and psychotic symptoms. Continues to have anxiety and racing thoughts.  He would like his medications increased \"faster.\" Gabapentin will increase to 900mg tid tonight, Latuda to 80mg tomorrow AM, and Depakote HS dose to 1000mg tonight. At that point it will only be a matter of titrating Latuda back to 120mg daily. He did speak with his  yesterday. He is pleased with their conversation and indicates that she is not revoking his PD. Didn't expect she would with him being admitted voluntarily and asking for help.     Has been primarily pleasant. I participating " appropriately on the unit. Goes to groups and is social with peers.            DIagnoses:     Bipolar I Disorder, per history, currently depressed  PTSD, Chronic  Generalized Anxiety Disorder   Antisocial Personality Disorder    Attestation:  Patient has been seen and evaluated by me,[CW1.1]  Bridger Cade NP[CW1.2]          Interim History:   The patient's care was discussed with the treatment team and chart notes were reviewed.          Medications:   I have reviewed this patient's current medications[CW1.1]  Prescription Medications as of 3/10/2018             Lurasidone HCl (LATUDA PO) Take 120 mg by mouth daily Take daily in the morning with breakfast    divalproex sodium delayed-release (DEPAKOTE) 500 MG DR tablet Take 500 mg by mouth daily Take daily in the morning    divalproex sodium delayed-release (DEPAKOTE) 500 MG DR tablet Take 1,000 mg by mouth At Bedtime    TRAZODONE HCL PO Take 150 mg by mouth At Bedtime    GABAPENTIN PO Take 600 mg by mouth 3 times daily    nicotine polacrilex (NICORETTE) 4 MG lozenge Place 8 mg inside cheek every hour as needed for smoking cessation      Facility Administered Medications as of 3/10/2018             gabapentin (NEURONTIN) capsule 900 mg Take 3 capsules (900 mg) by mouth 3 times daily    divalproex sodium delayed-release (DEPAKOTE) DR tablet 500 mg Starting on 3/11/2018. Take 1 tablet (500 mg) by mouth daily    divalproex sodium delayed-release (DEPAKOTE SPRINKLE) DR capsule 1,000 mg Take 8 capsules (1,000 mg) by mouth At Bedtime    lurasidone (LATUDA) tablet 80 mg Starting on 3/11/2018. Take 2 tablets (80 mg) by mouth daily (with breakfast)    gabapentin (NEURONTIN) tablet 600 mg (Discontinued) Take 1 tablet (600 mg) by mouth 3 times daily    acetaminophen (TYLENOL) tablet 650 mg Take 2 tablets (650 mg) by mouth every 4 hours as needed for mild pain    alum & mag hydroxide-simethicone (MYLANTA ES/MAALOX  ES) suspension 30 mL Take 30 mLs by mouth every 4 hours as  "needed for indigestion    magnesium hydroxide (MILK OF MAGNESIA) suspension 30 mL Take 30 mLs by mouth nightly as needed for constipation    bisacodyl (DULCOLAX) Suppository 10 mg Place 1 suppository (10 mg) rectally daily as needed for constipation    traZODone (DESYREL) tablet 50 mg Take 1 tablet (50 mg) by mouth nightly as needed for sleep    hydrOXYzine (ATARAX) tablet 25-50 mg Take 1-2 tablets (25-50 mg) by mouth every 4 hours as needed for anxiety    OLANZapine (zyPREXA) tablet 10 mg Take 1 tablet (10 mg) by mouth 3 times daily as needed for agitation (associated with psychosis or jacob)    Linked Group 1:  \"Or\" Linked Group Details     OLANZapine (zyPREXA) injection 10 mg Inject 10 mg into the muscle 3 times daily as needed for agitation (associated with psychosis or jacob)    Linked Group 1:  \"Or\" Linked Group Details     traZODone (DESYREL) tablet 150 mg Take 1 tablet (150 mg) by mouth At Bedtime    nicotine polacrilex lozenge 4-8 mg Place 1-2 lozenges (4-8 mg) inside cheek every hour as needed for smoking cessation    gabapentin (NEURONTIN) capsule 300 mg (Discontinued) Take 1 capsule (300 mg) by mouth 3 times daily    lurasidone (LATUDA) tablet 60 mg (Discontinued) Take 60 mg by mouth daily (with breakfast)    divalproex sodium delayed-release (DEPAKOTE) DR tablet 500 mg (Discontinued) Take 1 tablet (500 mg) by mouth 2 times daily[CW1.2]          10 point ROS negative        Allergies:     Allergies   Allergen Reactions     Latex             Psychiatric Examination:   BP 94/53  Pulse 77  Temp 97.5  F (36.4  C) (Tympanic)  Resp 12  Ht 1.829 m (6')  Wt 106.4 kg (234 lb 8 oz)  SpO2 96%  BMI 31.8 kg/m2  Weight is 234 lbs 8 oz  Body mass index is 31.8 kg/(m^2).    Appearance:  awake, alert and adequately groomed  Attitude:  cooperative  Eye Contact:  good  Mood:  depressed  Affect:  mood congruent  Speech:  clear, coherent  Psychomotor Behavior:  no evidence of tardive dyskinesia, dystonia, or " tics  Thought Process:  logical, linear and goal oriented  Associations:  no loose associations  Thought Content:  no evidence of psychotic thought and passive suicidal ideation present  Insight:  good  Judgment:  intact  Oriented to:  time, person, and place  Attention Span and Concentration:  intact  Recent and Remote Memory:  intact  Fund of Knowledge: appropriate  Muscle Strength and Tone: normal  Gait and Station: Normal           Labs:   No labs today           Plan:   Increase HS depakote to 1000mg. Obtain level 3/13 or prior to discharge.   Increase Gabapentin to 900mg tid.  Increase Latuda to 80mg daily - titrating back to 120mg daily.  ELOS another 2-3 days with day treat and medication adjustment.[CW1.1]      Revision History        User Key Date/Time User Provider Type Action    > CW1.2 3/10/2018  2:28 PM Bridger Cade NP Nurse Practitioner Sign     CW1.1 3/10/2018  2:21 PM Bridger Cade NP Nurse Practitioner                   Procedure Notes     No notes of this type exist for this encounter.      Progress Notes - Therapies (Notes from 03/10/18 through 03/13/18)     No notes of this type exist for this encounter.

## 2018-03-08 NOTE — IP AVS SNAPSHOT
` `       Patient Information     Patient Name Sex     Chandan Ma ([id number]) Male 1980       Room Bed    556 556-2      Patient Demographics     Address Phone    [address]  [facility] [address] [phone] (Home)      Patient Ethnicity & Race     Ethnic Group Patient Race    American White      Emergency Contact(s)     Name Relation Home Work Mobile    NO PRIMARY CONTACT  [phone]        Documents on File        Status Date Received Description       Documents for the Patient    Insurance Card Not Received      Patient ID       Consent for EHR Access-Received-ESign       Consent for EHR Access-Decline-ESign       Consent for Services/Privacy Notice - Hospital/Clinic       Privacy Notice - [place] Received 18     Care Everywhere Prospective Auth Received 18     Consent for EHR Access Received 18     Consent for Services/Privacy Notice - Hospital/Clinic          Documents for the Encounter    CE Point of Care Auth Received        Admission Information     Attending Provider Admitting Provider Admission Type Admission Date/Time    Ruby Boyce NP Simon, John E, MD Emergency 18  0906    Discharge Date Hospital Service Auth/Cert Status Service Area     LT Medicaid Commitment Incomplete [facility] SERVICES    Unit Room/Bed Admission Status       [facility] 556/556-2 Admission (Confirmed)       Admission     Complaint    mental health, Depression with suicidal ideation      Hospital Account     Name Acct ID Class Status Primary Coverage    Chandan Ma [id number] [facility] Psychiatric Open None            Guarantor Account (for Hospital Account #[id number])     Name Relation to Pt Service Area Active? Acct Type    Chandan Ma Self [facility] Yes Behavioral    Address Phone          [address] [place] [address] [place] [address] [phone](H)              Coverage Information (for Hospital Account #[id number])     Not on file
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
## 2018-03-08 NOTE — IP AVS SNAPSHOT
MRN:[id number]                      After Visit Summary   3/8/2018    Chandan Ma    MRN: [id number]           Thank you!     Thank you for choosing [facility] for your care. Our goal is always to provide you with excellent care. Hearing back from our patients is one way we can continue to improve our services. Please take a few minutes to complete the written survey that you may receive in the mail after you visit with us. Thank you!        Patient Information     Date Of Birth          1980        Designated Caregiver       Most Recent Value    Caregiver    Will someone help with your care after discharge? no      About your hospital stay     You were admitted on:  March 8, 2018 You last received care in the:  [facility]    You were discharged on:  March 13, 2018       Who to Call     For medical emergencies, please call 911.  For non-urgent questions about your medical care, please call your primary care provider or clinic, None          Attending Provider     Provider Specialty    Lance Van MD Psychiatry    Ruby Boyce NP Licensed Mental Health       Primary Care Provider Fax #    Physician No Ref-Primary [phone]      Further instructions from your care team       Behavioral Discharge Planning and Instructions    Summary: Chandan is a 38 year-old male who was admitted to [address] [place] [address] with suicidal ideation.    Main Diagnosis: Bipolar I Disorder, per history, currently depressed, PTSD, Chronic, Generalized Anxiety Disorder, Antisocial Personality Disorder    Major Treatments, Procedures and Findings: Stabilize with medications, connect with community programs.    Symptoms to Report: feeling more aggressive, increased confusion, losing more sleep, mood getting worse or thoughts of suicide    Lifestyle Adjustment: Take all medications as prescribed, meet with doctor/ medication provider, out patient therapist, , and [facility] worker as scheduled. Abstain from  alcohol or any unprescribed drugs.    Psychiatry Follow-up:     [facility]: Mi Priestangel Thursday, May 24th at 12:00 and June 21st @ 2:00 pm  Call 24 hours in advance if canceling.   [address]  [place], [place]  Phone: [phone]  Fax: [phone]    [facility]:  [address]  [place] [address]  [phone]  Toll Free: [phone]  Fax: [phone]    [facility]: : Johanna Weir [phone]  [address] [place] [address]  Phone: [phone]    [facility]  PCP-  Alvarado 3/23 @1:10   [address] [place] [address]  Phone: [phone]   Fax: [phone]     Resources:   Crisis Intervention: 582.896.2909 or 142-491-9095 (TTY: 340.136.8463).  Call anytime for help.  National [place] on Mental Illness (www.[place].liliam.org): 720.931.6292 or 296-041-0229.  Alcoholics Anonymous (www.alcoholics-anonymous.org): Check your phone book for your local chapter.  Suicide Awareness Voices of Education (SAVE) (www.save.org): 179-358-JDLJ (3227)  National Suicide Prevention Line (www.mentalhealthmn.org): 959-702-TIKT (8865)  Mental Health Consumer/Survivor Network of [place] (www.mhcsn.net): 491.869.9090 or 181-313-6859  Mental Health Association of [place] (www.mentalhealth.org): 857.682.5359 or 542-760-9588    General Medication Instructions:   See your medication sheet(s) for instructions.   Take all medicines as directed.  Make no changes unless your doctor suggests them.   Go to all your doctor visits.  Be sure to have all your required lab tests. This way, your medicines can be refilled on time.  Do not use any drugs not prescribed by your doctor.  Avoid alcohol.    [place] Area:  [facility], Crisis stabilization [facility]- [phone]  [facility] Crisis Line: 1-821.141.4077  Advocates For Family Peace: 203-7662  Sexual Assault Program [facility]  "[facility]: 212.760.2243 or 1-913.568.8345  [place] Battered Women's Program: 1-485.312.1848 Ext: 279       Calls answered Mon-Fri-8:00 am--4:30 pm    [place]:  Advocates for Family Peace: 1-919.903.4497  [facility] first call for help: 0-478-937-5657  [facility]:  (583) 837-2632      [place] Area:  Warm Line: 1-185.493.5682       Calls answered Tuesday--Saturday 4:00 pm--10:00 pm  [facility] Crisis Line - [phone]  [facility] 763-481-8084    [place] Statewide:  [place] Crisis and Referral Services: 8-695-508-7158  National Suicide Prevention Lifeline: 0-603-498-TALK (9876)   - nhc6tgkj- Text  Life  to 08766  First Call for Help: 2-1-1  [facility] Helpline- 0-216-VUBE-HELP        Pending Results     No orders found from 3/6/2018 to 3/9/2018.            Statement of Approval     Ordered          03/13/18 1039  I have reviewed and agree with all the recommendations and orders detailed in this document.  EFFECTIVE NOW     Approved and electronically signed by:  Bridger Cade NP             Admission Information     Date & Time Provider Department Dept. Phone    3/8/2018 Ruby Boyce NP [facility] [phone]      Your Vitals Were     Blood Pressure Pulse Temperature Respirations Height Weight    110/83 82 96  F (35.6  C) (Tympanic) 16 1.829 m (6') 109.6 kg (241 lb 9.6 oz)    Pulse Oximetry BMI (Body Mass Index)                98% 32.77 kg/m2          SoundBetter Information     SoundBetter lets you send messages to your doctor, view your test results, renew your prescriptions, schedule appointments and more. To sign up, go to [email or web address] . Click on \"Log in\" on the left side of the screen, which will take you to the Welcome page. Then click on \"Sign up Now\" on the right side of the page.     You will be asked to enter the access code listed below, as well as some personal information. Please follow the directions to create your username and " password.     Your access code is: [email or web address]  Expires: 2018 11:32 AM     Your access code will  in 90 days. If you need help or a new code, please call your [place] clinic or [phone].        Care EveryWhere ID     This is your Care EveryWhere ID. This could be used by other organizations to access your [place] medical records  [id number]        Equal Access to Services     ALBERTO CARD : Hadii jasmin ku hadasho Soomaali, waaxda luqadaha, qaybta kaalmada adeegyada, waxay galein haysanfordn laurie anisanathan mcneal . So [facility] [phone].    ATENCIÓN: Si habla destiny, tiene a allen disposición servicios gratuitos de asistencia lingüística. Llame al [phone].    We comply with applicable federal civil rights and [place] laws. We do not discriminate on the basis of race, color, national origin, age, disability, sex, sexual orientation or gender identity.                             Review of your medicines      START taking        Dose / Directions    hydrOXYzine 25 MG tablet   Commonly known as:  ATARAX   Used for:  Anxiety        Dose:  25-50 mg   Take 1-2 tablets (25-50 mg) by mouth every 4 hours as needed for anxiety   Quantity:  60 tablet   Refills:  0         CONTINUE these medicines which may have CHANGED, or have new prescriptions. If we are uncertain of the size of tablets/capsules you have at home, strength may be listed as something that might have changed.        Dose / Directions    gabapentin 300 MG capsule   Commonly known as:  NEURONTIN   This may have changed:    - medication strength  - how much to take   Used for:  Anxiety, Bipolar I disorder (H)        Dose:  900 mg   Take 3 capsules (900 mg) by mouth 3 times daily   Quantity:  270 capsule   Refills:  0       lurasidone 120 MG Tabs tablet   Commonly known as:  LATUDA   This may have changed:    - medication strength  - when to take this  - additional instructions   Used for:  Bipolar I disorder (H)        Dose:  120 mg   Start taking  on:  3/14/2018   Take 1 tablet (120 mg) by mouth daily (with breakfast)   Quantity:  30 tablet   Refills:  0       nicotine polacrilex 4 MG lozenge   Commonly known as:  NICORETTE   This may have changed:  how much to take   Used for:  Tobacco dependence syndrome        Dose:  4-8 mg   Place 1-2 lozenges (4-8 mg) inside cheek every hour as needed for smoking cessation   Quantity:  168 tablet   Refills:  0       traZODone 150 MG tablet   Commonly known as:  DESYREL   This may have changed:  medication strength   Used for:  Anxiety        Dose:  150 mg   Take 1 tablet (150 mg) by mouth At Bedtime   Quantity:  30 tablet   Refills:  0         CONTINUE these medicines which have NOT CHANGED        Dose / Directions    * divalproex sodium delayed-release 500 MG DR tablet   Commonly known as:  DEPAKOTE   Used for:  Bipolar I disorder (H)        Dose:  500 mg   Take 1 tablet (500 mg) by mouth daily Take daily in the morning   Quantity:  30 tablet   Refills:  0       * divalproex sodium delayed-release 500 MG DR tablet   Commonly known as:  DEPAKOTE   Used for:  Bipolar I disorder (H)        Dose:  1000 mg   Take 2 tablets (1,000 mg) by mouth At Bedtime   Quantity:  60 tablet   Refills:  0       * Notice:  This list has 2 medication(s) that are the same as other medications prescribed for you. Read the directions carefully, and ask your doctor or other care provider to review them with you.         Where to get your medicines      These medications were sent to [facility] - CLAUDIA DANIELSON - [address]  [address] [place] [address]     Phone:  [phone]     divalproex sodium delayed-release 500 MG DR tablet    divalproex sodium delayed-release 500 MG DR tablet    gabapentin 300 MG capsule    hydrOXYzine 25 MG tablet    lurasidone 120 MG Tabs tablet    nicotine polacrilex 4 MG lozenge    traZODone 150 MG tablet                Protect others around you: Learn how to safely use, store and throw away your  medicines at www.disposemymeds.org.             Medication List: This is a list of all your medications and when to take them. Check marks below indicate your daily home schedule. Keep this list as a reference.      Medications           Morning Afternoon Evening Bedtime As Needed    * divalproex sodium delayed-release 500 MG DR tablet   Commonly known as:  DEPAKOTE   Take 1 tablet (500 mg) by mouth daily Take daily in the morning   Last time this was given:  500 mg on 3/13/2018  8:27 AM                                * divalproex sodium delayed-release 500 MG DR tablet   Commonly known as:  DEPAKOTE   Take 2 tablets (1,000 mg) by mouth At Bedtime   Last time this was given:  500 mg on 3/13/2018  8:27 AM                                gabapentin 300 MG capsule   Commonly known as:  NEURONTIN   Take 3 capsules (900 mg) by mouth 3 times daily   Last time this was given:  900 mg on 3/13/2018  8:27 AM                                hydrOXYzine 25 MG tablet   Commonly known as:  ATARAX   Take 1-2 tablets (25-50 mg) by mouth every 4 hours as needed for anxiety   Last time this was given:  50 mg on 3/13/2018 10:43 AM                                lurasidone 120 MG Tabs tablet   Commonly known as:  LATUDA   Take 1 tablet (120 mg) by mouth daily (with breakfast)   Start taking on:  3/14/2018   Last time this was given:  100 mg on 3/13/2018  8:28 AM                                nicotine polacrilex 4 MG lozenge   Commonly known as:  NICORETTE   Place 1-2 lozenges (4-8 mg) inside cheek every hour as needed for smoking cessation   Last time this was given:  4 mg on 3/13/2018  8:58 AM                                traZODone 150 MG tablet   Commonly known as:  DESYREL   Take 1 tablet (150 mg) by mouth At Bedtime   Last time this was given:  50 mg on 3/12/2018 11:58 PM                                * Notice:  This list has 2 medication(s) that are the same as other medications prescribed for you. Read the directions carefully,  and ask your doctor or other care provider to review them with you.

## 2018-03-08 NOTE — PLAN OF CARE
Problem: Patient Care Overview  Goal: Discharge Needs Assessment  Pt informed writer that he is currently on commitment thru [facility] and provided writer with his 's name and number:  Writer called and left a message with the .  Writer also called [facility] Court Administration and requested a copy of pt's commitment papers.

## 2018-03-09 PROCEDURE — 25000132 ZZH RX MED GY IP 250 OP 250 PS 637: Performed by: NURSE PRACTITIONER

## 2018-03-09 PROCEDURE — 12400011

## 2018-03-09 PROCEDURE — 12400000 ZZH R&B MH

## 2018-03-09 RX ORDER — GABAPENTIN 600 MG/1
600 TABLET ORAL 3 TIMES DAILY
Status: DISCONTINUED | OUTPATIENT
Start: 2018-03-09 | End: 2018-03-10

## 2018-03-09 RX ADMIN — NICOTINE POLACRILEX 4 MG: 4 LOZENGE ORAL at 21:37

## 2018-03-09 RX ADMIN — TRAZODONE HYDROCHLORIDE 150 MG: 150 TABLET ORAL at 20:03

## 2018-03-09 RX ADMIN — NICOTINE POLACRILEX 4 MG: 4 LOZENGE ORAL at 18:54

## 2018-03-09 RX ADMIN — HYDROXYZINE HYDROCHLORIDE 25 MG: 25 TABLET ORAL at 17:16

## 2018-03-09 RX ADMIN — GABAPENTIN 300 MG: 300 CAPSULE ORAL at 14:07

## 2018-03-09 RX ADMIN — NICOTINE POLACRILEX 4 MG: 4 LOZENGE ORAL at 16:11

## 2018-03-09 RX ADMIN — LURASIDONE HYDROCHLORIDE 60 MG: 40 TABLET, FILM COATED ORAL at 08:33

## 2018-03-09 RX ADMIN — NICOTINE POLACRILEX 4 MG: 4 LOZENGE ORAL at 20:03

## 2018-03-09 RX ADMIN — NICOTINE POLACRILEX 4 MG: 4 LOZENGE ORAL at 22:32

## 2018-03-09 RX ADMIN — NICOTINE POLACRILEX 4 MG: 4 LOZENGE ORAL at 17:16

## 2018-03-09 RX ADMIN — GABAPENTIN 600 MG: 600 TABLET, FILM COATED ORAL at 20:03

## 2018-03-09 RX ADMIN — DIVALPROEX SODIUM 500 MG: 500 TABLET, DELAYED RELEASE ORAL at 08:33

## 2018-03-09 RX ADMIN — GABAPENTIN 300 MG: 300 CAPSULE ORAL at 08:33

## 2018-03-09 RX ADMIN — NICOTINE POLACRILEX 4 MG: 4 LOZENGE ORAL at 15:01

## 2018-03-09 RX ADMIN — DIVALPROEX SODIUM 500 MG: 500 TABLET, DELAYED RELEASE ORAL at 20:03

## 2018-03-09 RX ADMIN — HYDROXYZINE HYDROCHLORIDE 50 MG: 25 TABLET ORAL at 22:32

## 2018-03-09 ASSESSMENT — ACTIVITIES OF DAILY LIVING (ADL)
DRESS: INDEPENDENT;SCRUBS (BEHAVIORAL HEALTH)
GROOMING: INDEPENDENT
DRESS: SCRUBS (BEHAVIORAL HEALTH)
GROOMING: INDEPENDENT
ORAL_HYGIENE: INDEPENDENT

## 2018-03-09 NOTE — PLAN OF CARE
Face to face end of shift report received from SEBASTIAN Taylor. Rounding completed. Patient observed.     Johanne Cardona  3/9/2018  5:50 PM

## 2018-03-09 NOTE — PLAN OF CARE
Problem: Patient Care Overview  Goal: Discharge Needs Assessment  Writer met with pt who was much more pleasant today. Discussed his housing situation and options.  Writer will call the board and lodges in [place] to see if there are any open beds? Pt also stated he has been unable to hold a job due to his mental illness: he recently lost his job at [employer].  States he needs to work because he has been unsuccessful with getting on disability.  Writer informed him of the Employment Connection with [facility] and will refer him to that. Ronnier continues to play phone tag with his .

## 2018-03-09 NOTE — PLAN OF CARE
Problem: Patient Care Overview  Goal: Discharge Needs Assessment  Writer had a voicemail from 's [facility]  Johanna Ray [phone].  Writer attempted several times throughout the day to call her back but was unsuccessful.  Writer called [facility] [phone] again and asked for a copy of his commitment paperwork.  They stated they would give the message to the person who handles commitments.

## 2018-03-09 NOTE — PLAN OF CARE
Problem: Patient Care Overview  Goal: Individualization & Mutuality  Pt will sleep at least 6 to 8 hours at night  Pt will attend at least 50% of groups and participate in unit milieu     Pt irritable with staff this morning, mad that staff woke him up for breakfast, saying he doesn't need to be treated like a kid and woke up to eat. Pt withdrawn and isolative to room. Pt compliant with medications.     Problem: Suicide Risk (Adult)  Goal: Physical Safety  Pt will remain free of self harm and or injury       Pt denies SI at this time. Pt has not had any episodes of self harm this shift.

## 2018-03-09 NOTE — PLAN OF CARE
Problem: Patient Care Overview  Goal: Team Discussion  Team Plan:   BEHAVIORAL TEAM DISCUSSION    Participants:  Ruby Boyce NP, Bridger Cade NP, Jennifer Rivera NP, [facility], Addie Shah Discharge Planner, Claudia Merida RN, Amelia Patel OT, Heavenly Adame OT  Progress: fair, new admit  Continued Stay Criteria/Rationale: restarted meds on lower doses  Medical/Physical: None known  Precautions:   Behavioral Orders   Procedures     Code 1 - Restrict to Unit     Routine Programming     As clinically indicated     Self Injury Precaution     Status 15     Every 15 minutes.     Plan: under comittment through other county, stabilize on meds  Rationale for change in precautions or plan: None

## 2018-03-09 NOTE — PLAN OF CARE
Face to face end of shift report received from Martha CORTES. Rounding completed. Patient observed.     Claudia Merida  3/9/2018  7:39 AM

## 2018-03-09 NOTE — PLAN OF CARE
Problem: Patient Care Overview  Goal: Individualization & Mutuality  Pt will sleep at least 6 to 8 hours at night  Pt will attend at least 50% of groups and participate in unit milieu     Outcome: Improving  Slept all noc without difficulty

## 2018-03-09 NOTE — PLAN OF CARE
"Problem: Patient Care Overview  Goal: Individualization & Mutuality  Pt will sleep at least 6 to 8 hours at night  Pt will attend at least 50% of groups and participate in unit milieu     Pt continues to endorse depression and suicidal thoughts stating \"it will probably take a few days for them to go away.\" He does contract for safety. Pt has been sitting out in the lounge this afternoon but is withdrawn. Reports anxiety as \"ok\" was encouraged to ask staff if needing anything to help with anxiety. Pt becomes irritable when asked questions. Affect is blunted and flat. No complaints of pain. Will continue to monitor.     Problem: Suicide Risk (Adult)  Goal: Physical Safety  Pt will remain free of self harm and or injury       Pt has remained free from harm      "

## 2018-03-09 NOTE — PLAN OF CARE
Face to face end of shift report received from SEBASTIAN Monterroso. Rounding completed. Patient observed. Lying in prone position - eyes closed - non-labored breathing noted.     Martha Good  3/9/2018  12:49 AM

## 2018-03-10 PROCEDURE — 25000132 ZZH RX MED GY IP 250 OP 250 PS 637: Performed by: NURSE PRACTITIONER

## 2018-03-10 PROCEDURE — 12400011

## 2018-03-10 PROCEDURE — 12400000 ZZH R&B MH

## 2018-03-10 PROCEDURE — 99232 SBSQ HOSP IP/OBS MODERATE 35: CPT | Performed by: NURSE PRACTITIONER

## 2018-03-10 RX ORDER — DIVALPROEX SODIUM 125 MG/1
1000 CAPSULE, COATED PELLETS ORAL AT BEDTIME
Status: DISCONTINUED | OUTPATIENT
Start: 2018-03-10 | End: 2018-03-13 | Stop reason: HOSPADM

## 2018-03-10 RX ORDER — LURASIDONE HYDROCHLORIDE 40 MG/1
80 TABLET, FILM COATED ORAL
Status: DISCONTINUED | OUTPATIENT
Start: 2018-03-11 | End: 2018-03-12

## 2018-03-10 RX ORDER — DIVALPROEX SODIUM 500 MG/1
500 TABLET, DELAYED RELEASE ORAL DAILY
Status: DISCONTINUED | OUTPATIENT
Start: 2018-03-11 | End: 2018-03-13 | Stop reason: HOSPADM

## 2018-03-10 RX ORDER — GABAPENTIN 300 MG/1
900 CAPSULE ORAL 3 TIMES DAILY
Status: DISCONTINUED | OUTPATIENT
Start: 2018-03-10 | End: 2018-03-13 | Stop reason: HOSPADM

## 2018-03-10 RX ADMIN — NICOTINE POLACRILEX 4 MG: 4 LOZENGE ORAL at 18:28

## 2018-03-10 RX ADMIN — NICOTINE POLACRILEX 4 MG: 4 LOZENGE ORAL at 17:19

## 2018-03-10 RX ADMIN — NICOTINE POLACRILEX 4 MG: 4 LOZENGE ORAL at 21:46

## 2018-03-10 RX ADMIN — NICOTINE POLACRILEX 4 MG: 4 LOZENGE ORAL at 12:21

## 2018-03-10 RX ADMIN — GABAPENTIN 900 MG: 300 CAPSULE ORAL at 20:12

## 2018-03-10 RX ADMIN — TRAZODONE HYDROCHLORIDE 50 MG: 50 TABLET ORAL at 00:31

## 2018-03-10 RX ADMIN — LURASIDONE HYDROCHLORIDE 60 MG: 40 TABLET, FILM COATED ORAL at 08:32

## 2018-03-10 RX ADMIN — DIVALPROEX SODIUM 1000 MG: 125 CAPSULE, COATED PELLETS ORAL at 20:12

## 2018-03-10 RX ADMIN — GABAPENTIN 600 MG: 600 TABLET, FILM COATED ORAL at 13:26

## 2018-03-10 RX ADMIN — NICOTINE POLACRILEX 4 MG: 4 LOZENGE ORAL at 06:15

## 2018-03-10 RX ADMIN — NICOTINE POLACRILEX 4 MG: 4 LOZENGE ORAL at 15:10

## 2018-03-10 RX ADMIN — HYDROXYZINE HYDROCHLORIDE 50 MG: 25 TABLET ORAL at 15:53

## 2018-03-10 RX ADMIN — DIVALPROEX SODIUM 500 MG: 500 TABLET, DELAYED RELEASE ORAL at 08:32

## 2018-03-10 RX ADMIN — NICOTINE POLACRILEX 4 MG: 4 LOZENGE ORAL at 10:18

## 2018-03-10 RX ADMIN — NICOTINE POLACRILEX 4 MG: 4 LOZENGE ORAL at 00:19

## 2018-03-10 RX ADMIN — GABAPENTIN 600 MG: 600 TABLET, FILM COATED ORAL at 08:32

## 2018-03-10 RX ADMIN — TRAZODONE HYDROCHLORIDE 150 MG: 150 TABLET ORAL at 20:12

## 2018-03-10 RX ADMIN — NICOTINE POLACRILEX 4 MG: 4 LOZENGE ORAL at 08:35

## 2018-03-10 RX ADMIN — HYDROXYZINE HYDROCHLORIDE 50 MG: 25 TABLET ORAL at 11:44

## 2018-03-10 RX ADMIN — NICOTINE POLACRILEX 4 MG: 4 LOZENGE ORAL at 20:14

## 2018-03-10 RX ADMIN — HYDROXYZINE HYDROCHLORIDE 50 MG: 25 TABLET ORAL at 20:16

## 2018-03-10 ASSESSMENT — ACTIVITIES OF DAILY LIVING (ADL)
GROOMING: INDEPENDENT
ORAL_HYGIENE: INDEPENDENT
LAUNDRY: UNABLE TO COMPLETE
GROOMING: INDEPENDENT
ORAL_HYGIENE: INDEPENDENT
DRESS: INDEPENDENT;SCRUBS (BEHAVIORAL HEALTH)
LAUNDRY: UNABLE TO COMPLETE
DRESS: SCRUBS (BEHAVIORAL HEALTH)

## 2018-03-10 NOTE — PLAN OF CARE
Problem: Patient Care Overview  Goal: Individualization & Mutuality  Pt will sleep at least 6 to 8 hours at night  Pt will attend at least 50% of groups and participate in unit milieu     Outcome: No Change  Patient out on the open unit at start of shift, watching tv in the lounge. Denies all criteria. Affect is bright, mood is calm. States he is good. Has been medication compliant. Has been appropriate with staff and peers.   1144-requested and accepted Atarax 50 mg PO.    Problem: Suicide Risk (Adult)  Goal: Physical Safety  Pt will remain free of self harm and or injury       Outcome: No Change  Patient has remained free from self harm/injury this shift.

## 2018-03-10 NOTE — PLAN OF CARE
Face to face end of shift report received from SEBASTIAN Whiting. Rounding completed. Patient observed. In the lounge at shift change reading a 'mens health magazine' - nayana gum 2 mg po administered at 0020 by RN - per pt request and administered trazadone 50 mg po r/t c/o insomnia.  Also pt had snack of pudding, jello and several juices - pleasant, polite and appropriate.    Martha Good  3/10/2018  3:43 AM

## 2018-03-10 NOTE — PLAN OF CARE
Face to face end of shift report received from Martha COOPER RN. Rounding completed. Patient observed in [facility].     Marjorie Hopson  3/10/2018  7:55 AM

## 2018-03-10 NOTE — PLAN OF CARE
Problem: Patient Care Overview  Goal: Individualization & Mutuality  Pt will sleep at least 6 to 8 hours at night  Pt will attend at least 50% of groups and participate in unit milieu     Pt is up on the unit and interactive with peers. Attended MICD group this evening. Continues to have suicidal thoughts, stated he has no plan and will talk to staff if thoughts persist. Denied HI and hallucinations, rated anxiety 6, depression 9/10 and reported racing thoughts. Pt accepts PRN for anxiety when offered, requesting Trazadone for sleep. No physical complaints, stated he is hopeful and wanting to go to a [facility] at time of discharge.    Problem: Suicide Risk (Adult)  Goal: Physical Safety  Pt will remain free of self harm and or injury       Remains injury-free at this time, pt agreed to talk to staff if having thoughts of harming himself.

## 2018-03-10 NOTE — PROGRESS NOTES
"[facility]  Psychiatric Progress Note      Impression:     Chandan Ma is a 38 year old male who presented via [facility] in [place] with reports of suicidal thoughts and plans to hang himself. Was unable to report trigger but off medications for 2 weeks. Has been self-medicating with marijuana. Previous inpatient stays at [facility], [place], and [place].     Chandan continues to report passive SI and depressed mood. Denies any intent to harm himself. Denies HI and psychotic symptoms. Continues to have anxiety and racing thoughts.  He would like his medications increased \"faster.\" Gabapentin will increase to 900mg tid tonight, Latuda to 80mg tomorrow AM, and Depakote HS dose to 1000mg tonight. At that point it will only be a matter of titrating Latuda back to 120mg daily. He did speak with his  yesterday. He is pleased with their conversation and indicates that she is not revoking his PD. Didn't expect she would with him being admitted voluntarily and asking for help.     Has been primarily pleasant. I participating appropriately on the unit. Goes to groups and is social with peers.            DIagnoses:     Bipolar I Disorder, per history, currently depressed  PTSD, Chronic  Generalized Anxiety Disorder   Antisocial Personality Disorder    Attestation:  Patient has been seen and evaluated by me,  Bridger Cade NP          Interim History:   The patient's care was discussed with the treatment team and chart notes were reviewed.          Medications:   I have reviewed this patient's current medications  Prescription Medications as of 3/10/2018             Lurasidone HCl (LATUDA PO) Take 120 mg by mouth daily Take daily in the morning with breakfast    divalproex sodium delayed-release (DEPAKOTE) 500 MG DR tablet Take 500 mg by mouth daily Take daily in the morning    divalproex sodium delayed-release (DEPAKOTE) 500 MG DR tablet Take 1,000 mg by mouth At Bedtime    TRAZODONE " "HCL PO Take 150 mg by mouth At Bedtime    GABAPENTIN PO Take 600 mg by mouth 3 times daily    nicotine polacrilex (NICORETTE) 4 MG lozenge Place 8 mg inside cheek every hour as needed for smoking cessation      Facility Administered Medications as of 3/10/2018             gabapentin (NEURONTIN) capsule 900 mg Take 3 capsules (900 mg) by mouth 3 times daily    divalproex sodium delayed-release (DEPAKOTE) DR tablet 500 mg Starting on 3/11/2018. Take 1 tablet (500 mg) by mouth daily    divalproex sodium delayed-release (DEPAKOTE SPRINKLE) DR capsule 1,000 mg Take 8 capsules (1,000 mg) by mouth At Bedtime    lurasidone (LATUDA) tablet 80 mg Starting on 3/11/2018. Take 2 tablets (80 mg) by mouth daily (with breakfast)    gabapentin (NEURONTIN) tablet 600 mg (Discontinued) Take 1 tablet (600 mg) by mouth 3 times daily    acetaminophen (TYLENOL) tablet 650 mg Take 2 tablets (650 mg) by mouth every 4 hours as needed for mild pain    alum & mag hydroxide-simethicone (MYLANTA ES/MAALOX  ES) suspension 30 mL Take 30 mLs by mouth every 4 hours as needed for indigestion    magnesium hydroxide (MILK OF MAGNESIA) suspension 30 mL Take 30 mLs by mouth nightly as needed for constipation    bisacodyl (DULCOLAX) Suppository 10 mg Place 1 suppository (10 mg) rectally daily as needed for constipation    traZODone (DESYREL) tablet 50 mg Take 1 tablet (50 mg) by mouth nightly as needed for sleep    hydrOXYzine (ATARAX) tablet 25-50 mg Take 1-2 tablets (25-50 mg) by mouth every 4 hours as needed for anxiety    OLANZapine (zyPREXA) tablet 10 mg Take 1 tablet (10 mg) by mouth 3 times daily as needed for agitation (associated with psychosis or jacob)    Linked Group 1:  \"Or\" Linked Group Details     OLANZapine (zyPREXA) injection 10 mg Inject 10 mg into the muscle 3 times daily as needed for agitation (associated with psychosis or jacob)    Linked Group 1:  \"Or\" Linked Group Details     traZODone (DESYREL) tablet 150 mg Take 1 tablet (150 mg) " by mouth At Bedtime    nicotine polacrilex lozenge 4-8 mg Place 1-2 lozenges (4-8 mg) inside cheek every hour as needed for smoking cessation    gabapentin (NEURONTIN) capsule 300 mg (Discontinued) Take 1 capsule (300 mg) by mouth 3 times daily    lurasidone (LATUDA) tablet 60 mg (Discontinued) Take 60 mg by mouth daily (with breakfast)    divalproex sodium delayed-release (DEPAKOTE) DR tablet 500 mg (Discontinued) Take 1 tablet (500 mg) by mouth 2 times daily          10 point ROS negative        Allergies:     Allergies   Allergen Reactions     Latex             Psychiatric Examination:   BP 94/53  Pulse 77  Temp 97.5  F (36.4  C) (Tympanic)  Resp 12  Ht 1.829 m (6')  Wt 106.4 kg (234 lb 8 oz)  SpO2 96%  BMI 31.8 kg/m2  Weight is 234 lbs 8 oz  Body mass index is 31.8 kg/(m^2).    Appearance:  awake, alert and adequately groomed  Attitude:  cooperative  Eye Contact:  good  Mood:  depressed  Affect:  mood congruent  Speech:  clear, coherent  Psychomotor Behavior:  no evidence of tardive dyskinesia, dystonia, or tics  Thought Process:  logical, linear and goal oriented  Associations:  no loose associations  Thought Content:  no evidence of psychotic thought and passive suicidal ideation present  Insight:  good  Judgment:  intact  Oriented to:  time, person, and place  Attention Span and Concentration:  intact  Recent and Remote Memory:  intact  Fund of Knowledge: appropriate  Muscle Strength and Tone: normal  Gait and Station: Normal           Labs:   No labs today           Plan:   Increase HS depakote to 1000mg. Obtain level 3/13 or prior to discharge.   Increase Gabapentin to 900mg tid.  Increase Latuda to 80mg daily - titrating back to 120mg daily.  ELOS another 2-3 days with day treat and medication adjustment.

## 2018-03-10 NOTE — PLAN OF CARE
"Problem: Patient Care Overview  Goal: Individualization & Mutuality  Pt will sleep at least 6 to 8 hours at night  Pt will attend at least 50% of groups and participate in unit milieu     Outcome: Declining  Poor sleep this noc - maybe 2 hours at best - just administered nayana. Lozenge at 0630 - appears tired - does state \"i will go back to bed pretty soon\"      "

## 2018-03-10 NOTE — PLAN OF CARE
"Problem: Patient Care Overview  Goal: Individualization & Mutuality  Pt will sleep at least 6 to 8 hours at night  Pt will attend at least 50% of groups and participate in unit milieu     Pt denies SI, HI, and hallucinations. He has been up on the unit socializing with peers. He endorses in anxiety and depression rating 6/10. PRN atarax 50 mg po was given at 1553 with reports of decrease in anxiety. Pt says his sleeping schedule is opposite right now from sleeping 14 hours two days ago. Says he only got about 2 hours of sleep last night but is trying to stay up till after bed time medication to \"switch it back around.\" Pt is pleasant and cooperative. He denies pain. Complaint with prescribed medication. Will continue to monitor.   2016: PRN atarax given with bedtime medication at requested for ongoing anxiety.     Problem: Suicide Risk (Adult)  Goal: Physical Safety  Pt will remain free of self harm and or injury       Pt has remained free from harm      "

## 2018-03-11 PROCEDURE — 12400000 ZZH R&B MH

## 2018-03-11 PROCEDURE — 25000132 ZZH RX MED GY IP 250 OP 250 PS 637: Performed by: NURSE PRACTITIONER

## 2018-03-11 PROCEDURE — 12400011

## 2018-03-11 RX ADMIN — NICOTINE POLACRILEX 4 MG: 4 LOZENGE ORAL at 19:16

## 2018-03-11 RX ADMIN — GABAPENTIN 900 MG: 300 CAPSULE ORAL at 08:44

## 2018-03-11 RX ADMIN — HYDROXYZINE HYDROCHLORIDE 50 MG: 25 TABLET ORAL at 22:14

## 2018-03-11 RX ADMIN — NICOTINE POLACRILEX 4 MG: 4 LOZENGE ORAL at 23:37

## 2018-03-11 RX ADMIN — NICOTINE POLACRILEX 4 MG: 4 LOZENGE ORAL at 00:30

## 2018-03-11 RX ADMIN — NICOTINE POLACRILEX 4 MG: 4 LOZENGE ORAL at 17:24

## 2018-03-11 RX ADMIN — GABAPENTIN 900 MG: 300 CAPSULE ORAL at 13:33

## 2018-03-11 RX ADMIN — LURASIDONE HYDROCHLORIDE 80 MG: 40 TABLET, FILM COATED ORAL at 08:44

## 2018-03-11 RX ADMIN — NICOTINE POLACRILEX 4 MG: 4 LOZENGE ORAL at 15:45

## 2018-03-11 RX ADMIN — GABAPENTIN 900 MG: 300 CAPSULE ORAL at 20:14

## 2018-03-11 RX ADMIN — HYDROXYZINE HYDROCHLORIDE 50 MG: 25 TABLET ORAL at 12:18

## 2018-03-11 RX ADMIN — NICOTINE POLACRILEX 4 MG: 4 LOZENGE ORAL at 12:18

## 2018-03-11 RX ADMIN — NICOTINE POLACRILEX 4 MG: 4 LOZENGE ORAL at 21:02

## 2018-03-11 RX ADMIN — DIVALPROEX SODIUM 1000 MG: 125 CAPSULE, COATED PELLETS ORAL at 20:14

## 2018-03-11 RX ADMIN — TRAZODONE HYDROCHLORIDE 150 MG: 150 TABLET ORAL at 20:15

## 2018-03-11 RX ADMIN — HYDROXYZINE HYDROCHLORIDE 50 MG: 25 TABLET ORAL at 17:36

## 2018-03-11 RX ADMIN — DIVALPROEX SODIUM 500 MG: 500 TABLET, DELAYED RELEASE ORAL at 08:44

## 2018-03-11 RX ADMIN — NICOTINE POLACRILEX 4 MG: 4 LOZENGE ORAL at 08:44

## 2018-03-11 RX ADMIN — TRAZODONE HYDROCHLORIDE 50 MG: 50 TABLET ORAL at 23:30

## 2018-03-11 ASSESSMENT — ACTIVITIES OF DAILY LIVING (ADL)
GROOMING: INDEPENDENT
GROOMING: INDEPENDENT
LAUNDRY: UNABLE TO COMPLETE
ORAL_HYGIENE: INDEPENDENT
ORAL_HYGIENE: INDEPENDENT
DRESS: SCRUBS (BEHAVIORAL HEALTH)
DRESS: INDEPENDENT;SCRUBS (BEHAVIORAL HEALTH)
LAUNDRY: UNABLE TO COMPLETE

## 2018-03-11 NOTE — PLAN OF CARE
Problem: Patient Care Overview  Goal: Individualization & Mutuality  Pt will sleep at least 6 to 8 hours at night  Pt will attend at least 50% of groups and participate in unit milieu     Outcome: Improving  Slept approx 6 hours this noc - did not ask for wake up call this morning - so staff will let him sleep in a bit.

## 2018-03-11 NOTE — PLAN OF CARE
Problem: Patient Care Overview  Goal: Individualization & Mutuality  Pt will sleep at least 6 to 8 hours at night  Pt will attend at least 50% of groups and participate in unit milieu     Outcome: No Change  Patient cooperative with assessment questioning. Denies all criteria. Affect is full range, mood is calm. Was out on the open unit for breakfast, then went back to bed. Does come to the nurse's station with requests. Has been appropriate with staff and peers.   1218-requested and accepted Atarax 50 mg PO.  1430-patient has been in bed since lunch.    Problem: Suicide Risk (Adult)  Goal: Physical Safety  Pt will remain free of self harm and or injury       Outcome: Improving  Patient denies SI, and has remained free from self harm/injury this shift.

## 2018-03-11 NOTE — PLAN OF CARE
Face to face end of shift report received from Martha COOPER RN. Rounding completed. Patient observed in room.     Marjorie Hopson  3/11/2018  7:35 AM

## 2018-03-11 NOTE — PLAN OF CARE
"Face to face end of shift report received from SEBASTIAN Monterroso. Rounding completed. Patient observed. Awake on the unit - appears to be and admits to being tired - did request and receive a nayana lozenge - offered snack - politely declined and advised me \"if I get hungry I'll let you know\" and retired to bed.     Martha Good  3/11/2018  1:07 AM          "

## 2018-03-11 NOTE — PLAN OF CARE
Face to face end of shift report received from SEBASTIAN Amador. Rounding completed. Patient observed bed.     Kriss Ferreira  3/11/2018  3:36 PM

## 2018-03-11 NOTE — PLAN OF CARE
"Problem: Patient Care Overview  Goal: Individualization & Mutuality  Pt will sleep at least 6 to 8 hours at night  Pt will attend at least 50% of groups and participate in unit milieu     Pt is pleasant and cooperative with nursing assessment. He has been up on the unit socializing with peers much of this shift. Pt says he slept good last night but still feels very tired and slept a majority of the day. Is going to talk with provider about a medication he use to take to help keep sleep more regulated. Pt denies SI at this time but says they come and go throughout the day. PRN atarax 50 mg po was requested and given at 1736 for \"racing thoughts\" and 7/10 anxiety. Pt does not elaborate on the thoughts. No complaints of pain. Pt is compliant with prescribed medication.   2214: PRN atarax 50 mg po given as requested for anxiety     Problem: Suicide Risk (Adult)  Goal: Physical Safety  Pt will remain free of self harm and or injury       Pt has remained free from harm.       "

## 2018-03-12 PROCEDURE — 12400011

## 2018-03-12 PROCEDURE — 99232 SBSQ HOSP IP/OBS MODERATE 35: CPT | Performed by: NURSE PRACTITIONER

## 2018-03-12 PROCEDURE — 12400000 ZZH R&B MH

## 2018-03-12 PROCEDURE — 25000132 ZZH RX MED GY IP 250 OP 250 PS 637: Performed by: NURSE PRACTITIONER

## 2018-03-12 RX ORDER — LURASIDONE HYDROCHLORIDE 40 MG/1
120 TABLET, FILM COATED ORAL
Status: DISCONTINUED | OUTPATIENT
Start: 2018-03-14 | End: 2018-03-13 | Stop reason: HOSPADM

## 2018-03-12 RX ADMIN — DIVALPROEX SODIUM 1000 MG: 125 CAPSULE, COATED PELLETS ORAL at 20:00

## 2018-03-12 RX ADMIN — DIVALPROEX SODIUM 500 MG: 500 TABLET, DELAYED RELEASE ORAL at 08:12

## 2018-03-12 RX ADMIN — NICOTINE POLACRILEX 4 MG: 4 LOZENGE ORAL at 06:07

## 2018-03-12 RX ADMIN — NICOTINE POLACRILEX 4 MG: 4 LOZENGE ORAL at 23:58

## 2018-03-12 RX ADMIN — LURASIDONE HYDROCHLORIDE 80 MG: 40 TABLET, FILM COATED ORAL at 08:12

## 2018-03-12 RX ADMIN — TRAZODONE HYDROCHLORIDE 50 MG: 50 TABLET ORAL at 23:58

## 2018-03-12 RX ADMIN — NICOTINE POLACRILEX 4 MG: 4 LOZENGE ORAL at 00:56

## 2018-03-12 RX ADMIN — GABAPENTIN 900 MG: 300 CAPSULE ORAL at 13:17

## 2018-03-12 RX ADMIN — GABAPENTIN 900 MG: 300 CAPSULE ORAL at 08:12

## 2018-03-12 RX ADMIN — NICOTINE POLACRILEX 4 MG: 4 LOZENGE ORAL at 16:20

## 2018-03-12 RX ADMIN — NICOTINE POLACRILEX 4 MG: 4 LOZENGE ORAL at 12:35

## 2018-03-12 RX ADMIN — HYDROXYZINE HYDROCHLORIDE 50 MG: 25 TABLET ORAL at 16:20

## 2018-03-12 RX ADMIN — NICOTINE POLACRILEX 4 MG: 4 LOZENGE ORAL at 07:04

## 2018-03-12 RX ADMIN — NICOTINE POLACRILEX 4 MG: 4 LOZENGE ORAL at 08:30

## 2018-03-12 RX ADMIN — NICOTINE POLACRILEX 4 MG: 4 LOZENGE ORAL at 04:21

## 2018-03-12 RX ADMIN — NICOTINE POLACRILEX 8 MG: 4 LOZENGE ORAL at 18:27

## 2018-03-12 RX ADMIN — GABAPENTIN 900 MG: 300 CAPSULE ORAL at 20:02

## 2018-03-12 RX ADMIN — NICOTINE POLACRILEX 8 MG: 4 LOZENGE ORAL at 22:21

## 2018-03-12 RX ADMIN — TRAZODONE HYDROCHLORIDE 150 MG: 150 TABLET ORAL at 20:03

## 2018-03-12 RX ADMIN — HYDROXYZINE HYDROCHLORIDE 50 MG: 25 TABLET ORAL at 22:22

## 2018-03-12 ASSESSMENT — ACTIVITIES OF DAILY LIVING (ADL)
LAUNDRY: UNABLE TO COMPLETE
GROOMING: INDEPENDENT
ORAL_HYGIENE: INDEPENDENT
DRESS: SCRUBS (BEHAVIORAL HEALTH);INDEPENDENT
GROOMING: INDEPENDENT
DRESS: SCRUBS (BEHAVIORAL HEALTH);INDEPENDENT

## 2018-03-12 NOTE — PLAN OF CARE
"Problem: Patient Care Overview  Goal: Individualization & Mutuality  Pt will sleep at least 6 to 8 hours at night  Pt will attend at least 50% of groups and participate in unit milieu     Outcome: Declining  Poor sleep this noc - has had nayana lozenge several times - and trazadone 50 mg po at 0037 with little to no effect - has been up in the lounge area reading a book or having juice - appropriate, pleasant and polite - does admit to having a poor sleep pattern \"lately\"      "

## 2018-03-12 NOTE — PLAN OF CARE
Face to face end of shift report received from Martha Good RN. Rounding completed. Patient observed.     Claudia Singer  3/12/2018  8:23 AM

## 2018-03-12 NOTE — PLAN OF CARE
Face to face end of shift report received from SEBASTIAN Chacon. Rounding completed. Patient observed.     Sydnee Gross  3/12/2018  3:56 PM

## 2018-03-12 NOTE — PROGRESS NOTES
"[facility]  Psychiatric Progress Note      Impression:     Chandan Ma is a 38 year old male who presented via [facility] in [place] with reports of suicidal thoughts and plans to hang himself. Was unable to report trigger but off medications for 2 weeks. Has been self-medicating with marijuana. Previous inpatient stays at [facility], [place], and [place].     Tired today. Reports he slept poorly last night. Indicates, \"that's my life. Either I sleep a lot or my sleep sucks. I'm always tired since I was a kid.\" He was provided Trazodone last night sometime after midnight. Did not appear to help. Continues to report passive SI. Denies intent. Reports mood as \"Eh. Not great.\" Denies psychosis.     Is wanting to go to a [facility] as he can no longer afford rent. His CM is okay with this. He is under commitment through [facility]. His medical insurance has lapsed and financial did come up to help him get set up with MA.            DIagnoses:     Bipolar I Disorder, per history, currently depressed  PTSD, Chronic  Generalized Anxiety Disorder   Antisocial Personality Disorder    Attestation:  Patient has been seen and evaluated by me,  Bridger Cade NP          Interim History:   The patient's care was discussed with the treatment team and chart notes were reviewed.          Medications:   I have reviewed this patient's current medications  Prescription Medications as of 3/12/2018             Lurasidone HCl (LATUDA PO) Take 120 mg by mouth daily Take daily in the morning with breakfast    divalproex sodium delayed-release (DEPAKOTE) 500 MG DR tablet Take 500 mg by mouth daily Take daily in the morning    divalproex sodium delayed-release (DEPAKOTE) 500 MG DR tablet Take 1,000 mg by mouth At Bedtime    TRAZODONE HCL PO Take 150 mg by mouth At Bedtime    GABAPENTIN PO Take 600 mg by mouth 3 times daily    nicotine polacrilex (NICORETTE) 4 MG lozenge Place 8 mg inside cheek every hour as " "needed for smoking cessation      Facility Administered Medications as of 3/12/2018             lurasidone (LATUDA) tablet 100 mg Starting on 3/13/2018. Take 100 mg by mouth once    lurasidone (LATUDA) tablet 120 mg Starting on 3/13/2018. Take 3 tablets (120 mg) by mouth daily (with breakfast)    gabapentin (NEURONTIN) capsule 900 mg Take 3 capsules (900 mg) by mouth 3 times daily    divalproex sodium delayed-release (DEPAKOTE) DR tablet 500 mg Take 1 tablet (500 mg) by mouth daily    divalproex sodium delayed-release (DEPAKOTE SPRINKLE) DR capsule 1,000 mg Take 8 capsules (1,000 mg) by mouth At Bedtime    lurasidone (LATUDA) tablet 80 mg (Discontinued) Take 2 tablets (80 mg) by mouth daily (with breakfast)    acetaminophen (TYLENOL) tablet 650 mg Take 2 tablets (650 mg) by mouth every 4 hours as needed for mild pain    alum & mag hydroxide-simethicone (MYLANTA ES/MAALOX  ES) suspension 30 mL Take 30 mLs by mouth every 4 hours as needed for indigestion    magnesium hydroxide (MILK OF MAGNESIA) suspension 30 mL Take 30 mLs by mouth nightly as needed for constipation    bisacodyl (DULCOLAX) Suppository 10 mg Place 1 suppository (10 mg) rectally daily as needed for constipation    traZODone (DESYREL) tablet 50 mg Take 1 tablet (50 mg) by mouth nightly as needed for sleep    hydrOXYzine (ATARAX) tablet 25-50 mg Take 1-2 tablets (25-50 mg) by mouth every 4 hours as needed for anxiety    OLANZapine (zyPREXA) tablet 10 mg Take 1 tablet (10 mg) by mouth 3 times daily as needed for agitation (associated with psychosis or jacob)    Linked Group 1:  \"Or\" Linked Group Details     OLANZapine (zyPREXA) injection 10 mg Inject 10 mg into the muscle 3 times daily as needed for agitation (associated with psychosis or jacob)    Linked Group 1:  \"Or\" Linked Group Details     traZODone (DESYREL) tablet 150 mg Take 1 tablet (150 mg) by mouth At Bedtime    nicotine polacrilex lozenge 4-8 mg Place 1-2 lozenges (4-8 mg) inside cheek every " hour as needed for smoking cessation          10 point ROS negative        Allergies:     Allergies   Allergen Reactions     Latex             Psychiatric Examination:   /68  Pulse 83  Temp 97.5  F (36.4  C) (Tympanic)  Resp 18  Ht 1.829 m (6')  Wt 109.6 kg (241 lb 9.6 oz)  SpO2 97%  BMI 32.77 kg/m2  Weight is 241 lbs 9.6 oz  Body mass index is 32.77 kg/(m^2).    Appearance:  awake, alert and adequately groomed  Attitude:  cooperative  Eye Contact:  fair  Mood:  depressed  Affect:  appropriate and in normal range  Speech:  clear, coherent  Psychomotor Behavior:  no evidence of tardive dyskinesia, dystonia, or tics  Thought Process:  logical, linear and goal oriented  Associations:  no loose associations  Thought Content:  no evidence of psychotic thought and passive suicidal ideation present  Insight:  fair  Judgment:  fair  Oriented to:  time, person, and place  Attention Span and Concentration:  intact  Recent and Remote Memory:  intact  Fund of Knowledge: appropriate  Muscle Strength and Tone: normal  Gait and Station: Normal             Labs:   No labs today           Plan:   Continue HS depakote to 1000mg. Obtain level 3/13/ in AM.   Continue Gabapentin to 900mg tid.  Will increase Latuda to 100mg tomorrow and then 120mg on 3/14.   ELOS - waiting on possible B&L placement. Working with  out of [facility].

## 2018-03-12 NOTE — PLAN OF CARE
"Problem: Patient Care Overview  Goal: Individualization & Mutuality  Pt will sleep at least 6 to 8 hours at night  Pt will attend at least 50% of groups and participate in unit milieu     Patient is calm and cooperative with this writer during nursing assessment. He presents with flat, blunt affect. Pt does have delayed responses. Pt states he is \"feeling good\" but his biggest complaint this morning was that he didn't sleep well. He states he slept about an hour. Pt encouraged to talk to provider regarding this. Pt denies having suicidal ideation but reports fleeting feelings of SI. Pt contracts for safety. Pt denies having homicidal ideation, anxiety, or depression. Pt is social on the unit and is attending groups. Pt has no further complaints at this time. He is able to make needs known. Pt took medications as ordered. Will continue to monitor and document any changes at this time.   1338: Pt currently in bed resting at this time.   Problem: Suicide Risk (Adult)  Goal: Physical Safety  Pt will remain free of self harm and or injury       Pt free from self injury thus far in the shift.       "

## 2018-03-12 NOTE — PLAN OF CARE
Problem: Patient Care Overview  Goal: Team Discussion  Team Plan:   BEHAVIORAL TEAM DISCUSSION    Participants: Ruby Boyce NP,Bridger Cade NP, Jennifer Rivera NP,  Eun Michael LICSW, Ashley Rivera LICSW, Addie ELLIOTT, Sonia Garcia RN,  Lali Whitman RN, Candice Fine RN, Amelia Patel OT, Heavenly Adame OT  Progress: Fair: Attending programming.   Continued Stay Criteria/Rationale: Recent Increase HS depakote to 1000mg. Obtain level 3/13 or prior to discharge. Recent Increase Gabapentin to 900mg tid. Recent Increase Latuda to 80mg daily - titrating back to 120mg daily.  Medical/Physical: None known  Precautions:   Behavioral Orders   Procedures     Code 1 - Restrict to Unit     Routine Programming     As clinically indicated     Self Injury Precaution     Status 15     Every 15 minutes.     Plan: Pt financial came and met with pt to try and get him set up with MA. Referral sent to [facility] [place]. under comittment through other county, stabilize on meds.   Rationale for change in precautions or plan: none

## 2018-03-12 NOTE — PLAN OF CARE
.Face to face end of shift report received from SEBASTIAN Monterroso. Rounding completed. Patient observed. Up on the unit - asked for and received trazadone 50 mg po for c/o insomnia and a nayana lozenge - did also request a snack and was given jellos - juice - was appropriate, pleasant, and polite -     Martha Good  3/12/2018  1:29 AM

## 2018-03-12 NOTE — PLAN OF CARE
"Problem: Patient Care Overview  Goal: Discharge Needs Assessment  Writer spoke with pt's  Johanna Bell: [phone] with [facility].  Discussed what brought pt into the hospital.  Pt is on commitment until May.  When asked why pt is living in [place] when he is on commitment through [facility] ([facility]) she stated, \"That is where he wanted to live.\"  Discussed the fact that pt lost his job and will not be able to pay his rent after .  CM was okay with pt moving to a [facility].  Discussed the fact that his insurance had lapsed.  She was going to talk to the [facility] financial worker.     Pt financial came and met with pt to try and get him set up with MA.     [facility] [phone] who said they have an open bed and would need to to a phone screening with the pt.  Writer sent a referral to them.     Writer met with pt who was resting in his room and let him know that [facility] has an open bed and will call to complete a phone screen with him.   "

## 2018-03-13 VITALS
TEMPERATURE: 96 F | OXYGEN SATURATION: 98 % | SYSTOLIC BLOOD PRESSURE: 110 MMHG | WEIGHT: 241.6 LBS | BODY MASS INDEX: 32.72 KG/M2 | RESPIRATION RATE: 16 BRPM | DIASTOLIC BLOOD PRESSURE: 83 MMHG | HEIGHT: 72 IN | HEART RATE: 82 BPM

## 2018-03-13 LAB — VALPROATE SERPL-MCNC: 92 MG/L (ref 50–100)

## 2018-03-13 PROCEDURE — 25000132 ZZH RX MED GY IP 250 OP 250 PS 637: Performed by: NURSE PRACTITIONER

## 2018-03-13 PROCEDURE — 36415 COLL VENOUS BLD VENIPUNCTURE: CPT | Performed by: NURSE PRACTITIONER

## 2018-03-13 PROCEDURE — 99239 HOSP IP/OBS DSCHRG MGMT >30: CPT | Performed by: NURSE PRACTITIONER

## 2018-03-13 PROCEDURE — 80164 ASSAY DIPROPYLACETIC ACD TOT: CPT | Performed by: NURSE PRACTITIONER

## 2018-03-13 RX ORDER — HYDROXYZINE HYDROCHLORIDE 25 MG/1
25-50 TABLET, FILM COATED ORAL EVERY 4 HOURS PRN
Qty: 60 TABLET | Refills: 0 | Status: SHIPPED | OUTPATIENT
Start: 2018-03-13

## 2018-03-13 RX ORDER — DIVALPROEX SODIUM 500 MG/1
500 TABLET, DELAYED RELEASE ORAL DAILY
Qty: 30 TABLET | Refills: 0 | Status: SHIPPED | OUTPATIENT
Start: 2018-03-13

## 2018-03-13 RX ORDER — TRAZODONE HYDROCHLORIDE 150 MG/1
150 TABLET ORAL AT BEDTIME
Qty: 30 TABLET | Refills: 0 | Status: SHIPPED | OUTPATIENT
Start: 2018-03-13

## 2018-03-13 RX ORDER — GABAPENTIN 300 MG/1
900 CAPSULE ORAL 3 TIMES DAILY
Qty: 270 CAPSULE | Refills: 0 | Status: SHIPPED | OUTPATIENT
Start: 2018-03-13

## 2018-03-13 RX ORDER — LURASIDONE HYDROCHLORIDE 120 MG/1
120 TABLET, FILM COATED ORAL
Qty: 30 TABLET | Refills: 0 | Status: SHIPPED | OUTPATIENT
Start: 2018-03-14

## 2018-03-13 RX ORDER — DIVALPROEX SODIUM 500 MG/1
1000 TABLET, DELAYED RELEASE ORAL AT BEDTIME
Qty: 60 TABLET | Refills: 0 | Status: SHIPPED | OUTPATIENT
Start: 2018-03-13

## 2018-03-13 RX ADMIN — NICOTINE POLACRILEX 4 MG: 4 LOZENGE ORAL at 06:02

## 2018-03-13 RX ADMIN — HYDROXYZINE HYDROCHLORIDE 50 MG: 25 TABLET ORAL at 10:43

## 2018-03-13 RX ADMIN — NICOTINE POLACRILEX 4 MG: 4 LOZENGE ORAL at 08:58

## 2018-03-13 RX ADMIN — NICOTINE POLACRILEX 8 MG: 4 LOZENGE ORAL at 12:26

## 2018-03-13 RX ADMIN — GABAPENTIN 900 MG: 300 CAPSULE ORAL at 08:27

## 2018-03-13 RX ADMIN — GABAPENTIN 900 MG: 300 CAPSULE ORAL at 13:30

## 2018-03-13 RX ADMIN — DIVALPROEX SODIUM 500 MG: 500 TABLET, DELAYED RELEASE ORAL at 08:27

## 2018-03-13 RX ADMIN — LURASIDONE HYDROCHLORIDE 100 MG: 40 TABLET, FILM COATED ORAL at 08:28

## 2018-03-13 ASSESSMENT — ACTIVITIES OF DAILY LIVING (ADL)
GROOMING: INDEPENDENT
ORAL_HYGIENE: INDEPENDENT

## 2018-03-13 NOTE — PLAN OF CARE
Face to face end of shift report received from Sydnee CORTES. Rounding completed. Patient observed.     Claudia Merida  3/12/2018  11:38 PM

## 2018-03-13 NOTE — PLAN OF CARE
Discharge Note    Patient Discharged to home on 3/13/2018 1:43 PM via Taxi.     Patient informed of discharge instructions in AVS. patient verbalizes understanding and denies having any questions pertaining to AVS. Patient stable at time of discharge. Patient denies SI, HI, and thoughts of self harm at time of discharge. All personal belongings returned to patient. Discharge prescriptions sent to [facility] via electronic communication. Psych evaluation, history and physical, AVS, and discharge summary faxed to next level of care- [facility] Mi [facility]oval and Johanna Weir with [facility] and Human Services.     Candice Leroy  3/13/2018  1:33 PM

## 2018-03-13 NOTE — DISCHARGE SUMMARY
"Psychiatric Discharge Summary    Chandan Ma MRN# [id number]   Age: 38 year old YOB: 1980     Date of Admission:  3/8/2018  Date of Discharge:  3/13/2018  Admitting Physician:  Lance Van MD  Discharge Physician:  Bridger Cade NP          Event Leading to Hospitalization and Hospital Stay   HPI  Chandan Ma is a 38 year old male who presented via [facility] in [place] with reports of suicidal thoughts and plans to hang himself. Was unable to report trigger but off medications for 2 weeks. Has been self-medicating with marijuana. Previous inpatient stays at [facility], [place], and [place].      Chandan is fairly guarded and become irritable easily. He indicates that he is depressed, thinking of suicide, has deep dark thoughts, is angry with himself and overall feels aggravated. Reports that this has been going on for \"a couple days,\" but it sounds like he has been self-medicating with marijuana for several weeks. Indicates that his marijuana use has increased significantly secondary to symptoms, \"I smoke it all the time, like non-stop.\" Reports that generally he sleeps well when on medications, but struggles some without them. Denies issues with weight, appetite, symptoms of psychosis, and any history of prolonged elevation in mood with disruptive sleep.      He is somewhat defensive about his history and does not understand how discussing any of it is relevant to his current mental health status. He admits to a history of detention and jail time, being on both probation and parole, but refuses to discuss the details. Denies any history of violent behavior or current legal issues. Minimal records available at this time, but there is indicating that previous diagnoses have included Episodic Mood disorder, Anxiety Disorder, Polysubstance Dependence, Alcohol dependence, Drug induced mood disorder, Marijuana dependence, BEnzodiazepine Dependence, Impulse control disorder, and " Antisocial personality Disorder. There are also previous diagnoses made of Bipolar I Disorder, PTSD, NAVARRO. Previous medications in chart include Prozac, Melatonin, and Geodon.      Stay:  Admitted to unit voluntarily. Treated and monitored for improvement in mood. Monitored response to medications. Provided a safe environment and therapeutic milieu. Restarted Depakote DR at 500mg twice daily and titrated back to 500 daily and 1000 at bed. Restarted Gabapentin at 300mg three times daily and titrated back to 600mg three times daily. Was then increased to 900mg three times daily to further target anxiety. Restarted Latuda 60mg daily and titrated back to 120mg daily.  Restarted Trazodone 150mg at bed. Stabilized well. New boarding arranged.     At time of discharge, there is no evidence that patient is in immediate danger of self or others.        DIagnoses:     Bipolar I Disorder, per history, currently depressed  PTSD, Chronic  Generalized Anxiety Disorder   Antisocial Personality Disorder            Labs:     Results for orders placed or performed during the hospital encounter of 03/08/18   Valproic acid   Result Value Ref Range    Valproic Acid Level 92 50 - 100 mg/L            Discharge Medications:     Current Discharge Medication List      START taking these medications    Details   hydrOXYzine (ATARAX) 25 MG tablet Take 1-2 tablets (25-50 mg) by mouth every 4 hours as needed for anxiety  Qty: 60 tablet, Refills: 0    Associated Diagnoses: Anxiety         CONTINUE these medications which have CHANGED    Details   !! divalproex sodium delayed-release (DEPAKOTE) 500 MG DR tablet Take 1 tablet (500 mg) by mouth daily Take daily in the morning  Qty: 30 tablet, Refills: 0    Associated Diagnoses: Bipolar I disorder (H)      !! divalproex sodium delayed-release (DEPAKOTE) 500 MG DR tablet Take 2 tablets (1,000 mg) by mouth At Bedtime  Qty: 60 tablet, Refills: 0    Associated Diagnoses: Bipolar I disorder (H)       gabapentin (NEURONTIN) 300 MG capsule Take 3 capsules (900 mg) by mouth 3 times daily  Qty: 270 capsule, Refills: 0    Associated Diagnoses: Anxiety; Bipolar I disorder (H)      traZODone (DESYREL) 150 MG tablet Take 1 tablet (150 mg) by mouth At Bedtime  Qty: 30 tablet, Refills: 0    Associated Diagnoses: Anxiety      lurasidone (LATUDA) 120 MG TABS tablet Take 1 tablet (120 mg) by mouth daily (with breakfast)  Qty: 30 tablet, Refills: 0    Associated Diagnoses: Bipolar I disorder (H)      nicotine polacrilex (NICORETTE) 4 MG lozenge Place 1-2 lozenges (4-8 mg) inside cheek every hour as needed for smoking cessation  Qty: 168 tablet, Refills: 0    Associated Diagnoses: Tobacco dependence syndrome       !! - Potential duplicate medications found. Please discuss with provider.               Psychiatric Examination:   Appearance:  awake, alert and adequately groomed  Attitude:  cooperative  Eye Contact:  good  Mood:  better  Affect:  mood congruent  Speech:  clear, coherent  Psychomotor Behavior:  no evidence of tardive dyskinesia, dystonia, or tics  Thought Process:  logical, linear and goal oriented  Associations:  no loose associations  Thought Content:  no evidence of suicidal ideation or homicidal ideation and no evidence of psychotic thought  Insight:  good  Judgment:  fair  Oriented to:  time, person, and place  Attention Span and Concentration:  intact  Recent and Remote Memory:  intact  Fund of Knowledge: appropriate  Muscle Strength and Tone: normal  Gait and Station: Normal         Discharge Plan:   Discharge home via taxi. [facility] has a bed for him.     Psychiatry Follow-up:      [facility] Med[facility] Management: Mi Sandoval Thursday, May 24th at 12:00 and June 21st @ 2:00 pm  Call 24 hours in advance if canceling.   [address] [place] [address] [address]  [place], [place]  Phone: [phone]  Fax: [phone]     [facility] Human Services:  [address] [place] [address]  [address]  [place] [address]  [phone]  Toll Free: [phone]  Fax: [phone]     [facility] Services: : Johanna Weir [phone]  [address] [place] [address]  Phone: [phone]      Attestation:  The patient has been seen and evaluated by me,  Bridger Cade, NP         Discharge Services Provided:    35 minutes spent on discharge services, including:  Final examination of patient.  Review and discussion of Hospital stay.  Instructions for continued outpatient care/goals.  Preparation of discharge records.  Preparation of medications refills and new prescriptions.  Preparation of Applicable referral forms.

## 2018-03-13 NOTE — PLAN OF CARE
Face to face end of shift report received from Claudia FAUST RN. Rounding completed. Patient observed.     Candice Leroy  3/13/2018  8:05 AM

## 2018-03-13 NOTE — PLAN OF CARE
Problem: Patient Care Overview  Goal: Team Discussion  Team Plan:   BEHAVIORAL TEAM DISCUSSION    Participants:  Ruby Boyce NP,Bridger Cade NP, Jennifer Rivera NP,  Eun Michael LICSW, Ashley Rivera LICSW, Addie ELLIOTTW,Libby Aquino [facility], Heavenly Adame OT  Progress: Going to programming, same   Continued Stay Criteria/Rationale: increase latuda  Medical/Physical: none known   Precautions:   Behavioral Orders   Procedures     Code 1 - Restrict to Unit     Routine Programming     As clinically indicated     Self Injury Precaution     Status 15     Every 15 minutes.     Plan: D/C home today with services   Rationale for change in precautions or plan: none

## 2018-03-13 NOTE — DISCHARGE INSTRUCTIONS
Behavioral Discharge Planning and Instructions    Summary: Chandan is a 38 year-old male who was admitted to [address] [place] [address] with suicidal ideation.    Main Diagnosis: Bipolar I Disorder, per history, currently depressed, PTSD, Chronic, Generalized Anxiety Disorder, Antisocial Personality Disorder    Major Treatments, Procedures and Findings: Stabilize with medications, connect with community programs.    Symptoms to Report: feeling more aggressive, increased confusion, losing more sleep, mood getting worse or thoughts of suicide    Lifestyle Adjustment: Take all medications as prescribed, meet with doctor/ medication provider, out patient therapist, , and [facility] worker as scheduled. Abstain from alcohol or any unprescribed drugs.    Psychiatry Follow-up:     [facility] Medication Management: Mi Sandoval Thursday, May 24th at 12:00 and June 21st @ 2:00 pm  Call 24 hours in advance if canceling.   [address] [place] [address] [address]  [place], [place]  Phone: [phone]  Fax: [phone]    [facility] Human Services:  [address] [place] [address] [address]  [place] [address]  [phone]  Toll Free: [phone]  Fax: [phone]    [facility] Human Services: : Johanna Weir [phone]  [address] [place] [address]  Phone: [phone]    [facility]  PCP-  Alvarado 3/23 @1:10   [address] [place] [address] [address] [place] [address]  Phone: [phone]   Fax: [phone]     Resources:   Crisis Intervention: 623.718.8987 or 040-038-3272 (TTY: 241.279.4460).  Call anytime for help.  National [place] on Mental Illness (www.mn.liliam.org): 429.838.6266 or 372-554-8475.  Alcoholics Anonymous (www.alcoholics-anonymous.org): Check your phone book for your local chapter.  Suicide Awareness Voices of Education (SAVE) (www.save.org): 791-608-MZVJ (8732)  National Suicide Prevention Line (www.mentalhealthmn.org): 779-839-NTEH (0051)  Mental Health  Consumer/Survivor Network of [place] (www.mhcsn.net): 397.324.2455 or 873-040-8449  Mental Health Association of [place] (www.mentalhealth.org): 731.716.1972 or 585-473-8348    General Medication Instructions:   See your medication sheet(s) for instructions.   Take all medicines as directed.  Make no changes unless your doctor suggests them.   Go to all your doctor visits.  Be sure to have all your required lab tests. This way, your medicines can be refilled on time.  Do not use any drugs not prescribed by your doctor.  Avoid alcohol.    [place] Area:  [facility], Crisis stabilization [facility]- [phone]  [facility] Crisis Line: 9-971-220-6231  Advocates For Family Peace: 657-6864  Sexual Assault Program [facility]: 321.540.5798 or 1-824.434.2144  [facility] Battered Women's Program: 6-771-880-4968 Ext: 279       Calls answered Mon-Fri-8:00 am--4:30 pm    [place]:  Advocates for Family Peace: 2-737-054-3759  [facility] first call for help: 0-704-544-9186  [facility] Crisis Center:  (593) 203-3674      [place] Area:  Warm Line: 1-538.927.8308       Calls answered Tuesday--Saturday 4:00 pm--10:00 pm  [facility] Crisis Line - [phone]  [facility] Crisis Stabilization 941-613-4753    [place] Statewide:  [place] Crisis and Referral Services: 4-984-630-7263  National Suicide Prevention Lifeline: 3-616-776-TALK (8895)   - ftw6ryjc- Text  Life  to [address]  First Call for Help: 2-1-1  [facility] Helpline- 8-735-BTWU-HELP

## 2018-03-13 NOTE — PLAN OF CARE
"Problem: Patient Care Overview  Goal: Individualization & Mutuality  Pt will sleep at least 6 to 8 hours at night  Pt will attend at least 50% of groups and participate in unit milieu     Outcome: Improving  Pt. Pleasant via out shift, participated in 100% of groups, visited with peers, Denies pain. Questionable akathesia, legs noted to be constant motion, feelings \"I can't sit still, it feels like I have to move.\" Denies SI/HI.     Problem: Suicide Risk (Adult)  Goal: Physical Safety  Pt will remain free of self harm and or injury       Outcome: No Change  Pt. Remained free from injury via out the shift. Continues reports of fleeting SI; agrees with contracts for safety.       "

## 2018-03-13 NOTE — PLAN OF CARE
Problem: Patient Care Overview  Goal: Discharge Needs Assessment    Writer met with pt and went over his Aftercare Plan and had him sign a copy and provided him with a copy. Pt is discharging at the recommendation of the treatment team. Pt is discharging to home transported by [employer]. Pt denies having any thoughts of hurting themself or anyone else. Pt denies anxiety or depression. Pt has follow up with Mi Sandoval. Discharge instructions, including; demographic sheet, psychiatric evaluation, discharge summary, and AVS were faxed to these next level of care providers.

## 2018-03-13 NOTE — PLAN OF CARE
Problem: Patient Care Overview  Goal: Individualization & Mutuality  Pt will sleep at least 6 to 8 hours at night  Pt will attend at least 50% of groups and participate in unit milieu     Pt awake in bed at beginning of shift. Pt came to nurses station and was given Trazodone 50 mg at 2355 per request for sleep. Pt appeared to be sleeping after 0045, normal respirations and position changes noted. Pt awake and out in lobby at 0600 visiting with peers.